# Patient Record
Sex: MALE | Race: OTHER | NOT HISPANIC OR LATINO
[De-identification: names, ages, dates, MRNs, and addresses within clinical notes are randomized per-mention and may not be internally consistent; named-entity substitution may affect disease eponyms.]

---

## 2023-09-22 PROBLEM — Z00.00 ENCOUNTER FOR PREVENTIVE HEALTH EXAMINATION: Status: ACTIVE | Noted: 2023-09-22

## 2023-09-26 ENCOUNTER — APPOINTMENT (OUTPATIENT)
Dept: UROLOGY | Facility: CLINIC | Age: 78
End: 2023-09-26

## 2023-10-04 ENCOUNTER — APPOINTMENT (OUTPATIENT)
Dept: UROLOGY | Facility: CLINIC | Age: 78
End: 2023-10-04

## 2023-11-07 ENCOUNTER — APPOINTMENT (OUTPATIENT)
Dept: UROLOGY | Facility: CLINIC | Age: 78
End: 2023-11-07

## 2023-11-15 ENCOUNTER — APPOINTMENT (OUTPATIENT)
Dept: UROLOGY | Facility: CLINIC | Age: 78
End: 2023-11-15
Payer: MEDICARE

## 2023-11-15 VITALS
DIASTOLIC BLOOD PRESSURE: 80 MMHG | WEIGHT: 152 LBS | HEART RATE: 74 BPM | OXYGEN SATURATION: 95 % | RESPIRATION RATE: 14 BRPM | SYSTOLIC BLOOD PRESSURE: 148 MMHG

## 2023-11-15 PROCEDURE — 99204 OFFICE O/P NEW MOD 45 MIN: CPT

## 2023-11-16 LAB
APPEARANCE: CLEAR
BACTERIA: NEGATIVE /HPF
BILIRUBIN URINE: NEGATIVE
BLOOD URINE: NEGATIVE
CAST: 1 /LPF
COLOR: NORMAL
EPITHELIAL CELLS: 1 /HPF
GLUCOSE QUALITATIVE U: 500 MG/DL
KETONES URINE: NEGATIVE MG/DL
LEUKOCYTE ESTERASE URINE: ABNORMAL
MICROSCOPIC-UA: NORMAL
NITRITE URINE: NEGATIVE
PH URINE: 6.5
PROTEIN URINE: NORMAL MG/DL
RED BLOOD CELLS URINE: 3 /HPF
SPECIFIC GRAVITY URINE: 1.02
UROBILINOGEN URINE: 1 MG/DL
WHITE BLOOD CELLS URINE: 4 /HPF

## 2023-11-17 LAB — BACTERIA UR CULT: NORMAL

## 2023-11-20 LAB — URINE CYTOLOGY: NORMAL

## 2023-12-13 ENCOUNTER — APPOINTMENT (OUTPATIENT)
Dept: UROLOGY | Facility: CLINIC | Age: 78
End: 2023-12-13

## 2024-01-05 ENCOUNTER — NON-APPOINTMENT (OUTPATIENT)
Age: 79
End: 2024-01-05

## 2024-01-10 ENCOUNTER — NON-APPOINTMENT (OUTPATIENT)
Age: 79
End: 2024-01-10

## 2024-01-10 ENCOUNTER — APPOINTMENT (OUTPATIENT)
Dept: UROLOGY | Facility: CLINIC | Age: 79
End: 2024-01-10

## 2024-01-23 ENCOUNTER — APPOINTMENT (OUTPATIENT)
Dept: CT IMAGING | Facility: CLINIC | Age: 79
End: 2024-01-23
Payer: MEDICARE

## 2024-01-23 PROCEDURE — 74178 CT ABD&PLV WO CNTR FLWD CNTR: CPT

## 2024-01-26 ENCOUNTER — APPOINTMENT (OUTPATIENT)
Dept: UROLOGY | Facility: CLINIC | Age: 79
End: 2024-01-26
Payer: MEDICARE

## 2024-01-26 VITALS
HEART RATE: 75 BPM | WEIGHT: 152 LBS | OXYGEN SATURATION: 98 % | SYSTOLIC BLOOD PRESSURE: 121 MMHG | HEIGHT: 64 IN | RESPIRATION RATE: 14 BRPM | DIASTOLIC BLOOD PRESSURE: 66 MMHG | TEMPERATURE: 97.4 F | BODY MASS INDEX: 25.95 KG/M2

## 2024-01-26 DIAGNOSIS — R31.0 GROSS HEMATURIA: ICD-10-CM

## 2024-01-26 DIAGNOSIS — D49.4 NEOPLASM OF UNSPECIFIED BEHAVIOR OF BLADDER: ICD-10-CM

## 2024-01-26 PROCEDURE — 99215 OFFICE O/P EST HI 40 MIN: CPT | Mod: 25

## 2024-01-26 PROCEDURE — 52000 CYSTOURETHROSCOPY: CPT

## 2024-01-26 NOTE — HISTORY OF PRESENT ILLNESS
[FreeTextEntry1] : JOSETTE PHILLIPS Jan 24 1945  Language: Norwegian Date of First visit: 10/04/2023 Accompanied by: self Contact info: Referring Provider/PCP: Dr. Hawk Fax: [phone]  CC/ Problem List: Gross hematuria  =============================================================================== FIRST VISIT / Summary: Very pleasant 78 year old M here for gross hematuria, mostly brown for 2 days then stopped. Former smoker. Drinks about 5 coffees per day and one water.  LUTS, minimal sometimes slow stream, but usually not. No straining, no hesitancy or incontinence or dysuria.  They denied risk factors except as noted above including but not limited to: chemicals including dye, petroleum derivatives, chemotherapy, radiation, foreign objects (stents, catheters, stones, etc), or infections (TB, schistosomiasis, etc).  ------------------------------------------------------------------------------------------- INTERVAL VISITS:  ===============================================================================  PMH: HLD, memory Meds: Statin ?dementia medication All: nkda FHx: brother with stones. No  malignancies SocHx: smoker, about 40 yrs 1 pack / day. no etoh.  PSH: none  ROS: Review of Systems is as per HPI unless otherwise denoted below  =============================================================================== DATA: LABS (SELECTED):---------------------------------------------------------------------------------------------------   RADS:------------------------------------------------------------------------------------------------------------------- 1/23/24: CT urogram: no filling defects in upper tracts, bladder mass at dome.  PATHOLOGY/CYTOLOGY:-------------------------------------------------------------------------------------------   VOIDING STUDIES: ---------------------------------------------------------------------------------------------------- 11/15/2023 PVR: 9  STONE STUDIES: (Analysis/LLSA)----------------------------------------------------------------------------------   PROCEDURES: ----------------------------------------------------------------------------------------------- 01/26/2024 Cysto: Two tumors eac 1-2cm in size posterior left near dome. Additional erythematous mucosa lining one of a number of diverticuli.   =============================================================================== PHYSICAL EXAM:  FOCUSED: ---------------------------------------------------------------------------------------------------------------- Date: 01/26/2024 Chaperone: offered and patient declined   Penis: No lesions, tenderness, curvature, plaques. Circumcised Meatus: normal caliber  ======================================================================================= DISCUSSION: ======================================================================================= ASSESSMENT and PLAN  1.Gross hematuria - needs Urine cx, blood tests and EKG - he will see Dr. Hawk for clearance for surgery 2/8 - states not on any antiplatelents or DM medications that would need to be adjusted.  ======================================================================================= 5+ procedure  Based on the patient's findings as documented above, they decided to proceed with planned surgical management. Risk factors as identified in the history and assessment / plan. The patient's imaging study prior to this visit was personally reviewed and the above is my independent interpretation for the  organ systems.  They are at increased risk of morbidity from additional testing and treatment that may be required based on the results of the above evaluation and tumor extending into diverticuli.  Thank you for allowing me to assist in the care of your patient. Should you have any questions please do not hesitate to reach out to me.   Lobo Lopez MD       [facility] Physician [facility]  [place] Office: [address], [address] [place] [address] T: [phone] F: [phone]  [place] Office: [address], 1st [place] [address] T: [phone] F: [phone]

## 2024-01-28 LAB
ALBUMIN SERPL ELPH-MCNC: 4.3 G/DL
ALP BLD-CCNC: 121 U/L
ALT SERPL-CCNC: 13 U/L
ANION GAP SERPL CALC-SCNC: 9 MMOL/L
AST SERPL-CCNC: 17 U/L
BACTERIA UR CULT: NORMAL
BASOPHILS # BLD AUTO: 0.05 K/UL
BASOPHILS NFR BLD AUTO: 0.8 %
BILIRUB SERPL-MCNC: 0.2 MG/DL
BUN SERPL-MCNC: 14 MG/DL
CALCIUM SERPL-MCNC: 9.2 MG/DL
CHLORIDE SERPL-SCNC: 103 MMOL/L
CO2 SERPL-SCNC: 26 MMOL/L
CREAT SERPL-MCNC: 0.74 MG/DL
EGFR: 92 ML/MIN/1.73M2
EOSINOPHIL # BLD AUTO: 0.08 K/UL
EOSINOPHIL NFR BLD AUTO: 1.3 %
ESTIMATED AVERAGE GLUCOSE: 128 MG/DL
GLUCOSE SERPL-MCNC: 146 MG/DL
HBA1C MFR BLD HPLC: 6.1 %
HCT VFR BLD CALC: 39 %
HGB BLD-MCNC: 12.7 G/DL
IMM GRANULOCYTES NFR BLD AUTO: 0.2 %
INR PPP: 1.04 RATIO
LYMPHOCYTES # BLD AUTO: 2.26 K/UL
LYMPHOCYTES NFR BLD AUTO: 36.7 %
MAN DIFF?: NORMAL
MCHC RBC-ENTMCNC: 31.8 PG
MCHC RBC-ENTMCNC: 32.6 GM/DL
MCV RBC AUTO: 97.7 FL
MONOCYTES # BLD AUTO: 0.55 K/UL
MONOCYTES NFR BLD AUTO: 8.9 %
NEUTROPHILS # BLD AUTO: 3.21 K/UL
NEUTROPHILS NFR BLD AUTO: 52.1 %
PLATELET # BLD AUTO: 276 K/UL
POTASSIUM SERPL-SCNC: 4.4 MMOL/L
PROT SERPL-MCNC: 7.2 G/DL
PT BLD: 11.7 SEC
RBC # BLD: 3.99 M/UL
RBC # FLD: 11.8 %
SODIUM SERPL-SCNC: 139 MMOL/L
WBC # FLD AUTO: 6.16 K/UL

## 2024-01-29 LAB — ABO + RH PNL BLD: NORMAL

## 2024-02-07 RX ORDER — APREPITANT 80 MG/1
40 CAPSULE ORAL ONCE
Refills: 0 | Status: COMPLETED | OUTPATIENT
Start: 2024-02-15 | End: 2024-02-15

## 2024-02-07 RX ORDER — ACETAMINOPHEN 500 MG
1000 TABLET ORAL ONCE
Refills: 0 | Status: COMPLETED | OUTPATIENT
Start: 2024-02-15 | End: 2024-02-15

## 2024-02-08 ENCOUNTER — APPOINTMENT (OUTPATIENT)
Dept: UROLOGY | Facility: AMBULATORY SURGERY CENTER | Age: 79
End: 2024-02-08

## 2024-02-08 ENCOUNTER — NON-APPOINTMENT (OUTPATIENT)
Age: 79
End: 2024-02-08

## 2024-02-14 ENCOUNTER — TRANSCRIPTION ENCOUNTER (OUTPATIENT)
Age: 79
End: 2024-02-14

## 2024-02-14 NOTE — ASU PATIENT PROFILE, ADULT - NS PREOP UNDERSTANDS INFO
No solid food/dairy/candy/gum after 08:00am tomorrow; water allowed before 14:30pm tomorrow; patient reminded to come with photo ID/insurance/credit card; dress in comfortable clothes; no jewelries/contact lens/valuable; no smoking/alcohol drinking/recreational drug use today; escort to have photo ID; address and callback number was given;/yes

## 2024-02-14 NOTE — ASU PATIENT PROFILE, ADULT - NSICDXPASTMEDICALHX_GEN_ALL_CORE_FT
PAST MEDICAL HISTORY:  Hematuria     Mild hyperlipidemia     Stroke      PAST MEDICAL HISTORY:  Dementia     Hematuria     Mild hyperlipidemia     Osteoarthritis

## 2024-02-14 NOTE — ASU PATIENT PROFILE, ADULT - MEDICATION HERBAL REMEDIES, PROFILE
Physical Therapy Daily Progress Note  Visit: 5    Gilmar Masterson reports: I am feeling ok today. Did not have any trouble after new exercises    Subjective     Objective   See Exercise, Manual, and Modality Logs for complete treatment.       Assessment & Plan     Assessment  Assessment details: Pt tolerated treatment well. Discussed doing self MET correction at home until I see him next to help with right low back pain    Plan  Plan details: Begin resisted walking as able        Manual Therapy:    4     mins  26228;  Therapeutic Exercise:    39     mins  44514;     Neuromuscular Ziyad:    9    mins  57742;    Therapeutic Activity:     -     mins  64418;     Gait Training:      -     mins  07168;     Ultrasound:     -     mins  67248;    Electrical Stimulation:    -     mins  32754 ( );  Dry Needling     -     mins self-pay    Timed Treatment:   52   mins   Total Treatment:     62   mins    Sheila Grubbs PT  [place] License #: [id number]    Physical Therapist      
no

## 2024-02-15 ENCOUNTER — RESULT REVIEW (OUTPATIENT)
Age: 79
End: 2024-02-15

## 2024-02-15 ENCOUNTER — OUTPATIENT (OUTPATIENT)
Dept: OUTPATIENT SERVICES | Facility: HOSPITAL | Age: 79
LOS: 1 days | Discharge: HOME | End: 2024-02-15
Payer: MEDICARE

## 2024-02-15 ENCOUNTER — APPOINTMENT (OUTPATIENT)
Dept: UROLOGY | Facility: AMBULATORY SURGERY CENTER | Age: 79
End: 2024-02-15

## 2024-02-15 ENCOUNTER — TRANSCRIPTION ENCOUNTER (OUTPATIENT)
Age: 79
End: 2024-02-15

## 2024-02-15 VITALS
DIASTOLIC BLOOD PRESSURE: 75 MMHG | OXYGEN SATURATION: 99 % | RESPIRATION RATE: 16 BRPM | SYSTOLIC BLOOD PRESSURE: 133 MMHG | WEIGHT: 147.93 LBS | HEIGHT: 63 IN | TEMPERATURE: 97 F | HEART RATE: 68 BPM

## 2024-02-15 VITALS
HEART RATE: 59 BPM | SYSTOLIC BLOOD PRESSURE: 135 MMHG | DIASTOLIC BLOOD PRESSURE: 64 MMHG | OXYGEN SATURATION: 95 % | RESPIRATION RATE: 15 BRPM

## 2024-02-15 DIAGNOSIS — Z98.890 OTHER SPECIFIED POSTPROCEDURAL STATES: Chronic | ICD-10-CM

## 2024-02-15 PROCEDURE — 52240 CYSTOSCOPY AND TREATMENT: CPT

## 2024-02-15 PROCEDURE — 88305 TISSUE EXAM BY PATHOLOGIST: CPT | Mod: 26

## 2024-02-15 RX ORDER — SODIUM CHLORIDE 9 MG/ML
1000 INJECTION, SOLUTION INTRAVENOUS
Refills: 0 | Status: DISCONTINUED | OUTPATIENT
Start: 2024-02-15 | End: 2024-02-15

## 2024-02-15 RX ORDER — ACETAMINOPHEN 500 MG
650 TABLET ORAL EVERY 4 HOURS
Refills: 0 | Status: DISCONTINUED | OUTPATIENT
Start: 2024-02-15 | End: 2024-02-15

## 2024-02-15 RX ORDER — FENTANYL CITRATE 50 UG/ML
25 INJECTION INTRAVENOUS
Refills: 0 | Status: DISCONTINUED | OUTPATIENT
Start: 2024-02-15 | End: 2024-02-15

## 2024-02-15 RX ORDER — ALFUZOSIN HYDROCHLORIDE 10 MG/1
1 TABLET, EXTENDED RELEASE ORAL
Refills: 0 | DISCHARGE

## 2024-02-15 RX ORDER — ONDANSETRON 8 MG/1
4 TABLET, FILM COATED ORAL ONCE
Refills: 0 | Status: DISCONTINUED | OUTPATIENT
Start: 2024-02-15 | End: 2024-02-15

## 2024-02-15 RX ORDER — ROSUVASTATIN CALCIUM 5 MG/1
1 TABLET ORAL
Refills: 0 | DISCHARGE

## 2024-02-15 RX ORDER — MECLIZINE HCL 12.5 MG
1 TABLET ORAL
Refills: 0 | DISCHARGE

## 2024-02-15 RX ORDER — DONEPEZIL HYDROCHLORIDE 10 MG/1
1 TABLET, FILM COATED ORAL
Refills: 0 | DISCHARGE

## 2024-02-15 RX ORDER — ASPIRIN/CALCIUM CARB/MAGNESIUM 324 MG
0 TABLET ORAL
Refills: 0 | DISCHARGE

## 2024-02-15 RX ORDER — PHENAZOPYRIDINE HCL 100 MG
100 TABLET ORAL ONCE
Refills: 0 | Status: COMPLETED | OUTPATIENT
Start: 2024-02-15 | End: 2025-01-13

## 2024-02-15 RX ORDER — PHENAZOPYRIDINE HCL 100 MG
100 TABLET ORAL ONCE
Refills: 0 | Status: COMPLETED | OUTPATIENT
Start: 2024-02-15 | End: 2024-02-15

## 2024-02-15 RX ADMIN — APREPITANT 40 MILLIGRAM(S): 80 CAPSULE ORAL at 15:21

## 2024-02-15 RX ADMIN — Medication 100 MILLIGRAM(S): at 22:26

## 2024-02-15 RX ADMIN — Medication 1000 MILLIGRAM(S): at 15:21

## 2024-02-15 RX ADMIN — FENTANYL CITRATE 25 MICROGRAM(S): 50 INJECTION INTRAVENOUS at 22:21

## 2024-02-15 RX ADMIN — FENTANYL CITRATE 25 MICROGRAM(S): 50 INJECTION INTRAVENOUS at 21:59

## 2024-02-15 RX ADMIN — SODIUM CHLORIDE 100 MILLILITER(S): 9 INJECTION, SOLUTION INTRAVENOUS at 21:41

## 2024-02-15 NOTE — BRIEF OPERATIVE NOTE - NSICDXBRIEFPROCEDURE_GEN_ALL_CORE_FT
PROCEDURES:  Transurethral resection of bladder tumor (TURBT) with biopsy 15-Feb-2024 21:41:45  Edilberto Michelle

## 2024-02-15 NOTE — BRIEF OPERATIVE NOTE - SPECIMENS
1. bladder tumor 1 midline dome, 2. bladder tumor 2 dome, 3. bladder tumor 1 base, 4. posterior wall biopsy, 5. anterior wall biopsy, 6. left lateral wall biopsy, 7. right lateral wall biopsy

## 2024-02-15 NOTE — ASU DISCHARGE PLAN (ADULT/PEDIATRIC) - NS MD DC FALL RISK RISK
For information on Fall & Injury Prevention, visit: [email or web address] OR  [email or web address] OR  https://www.cdc.gov/steadi/patient.html

## 2024-02-15 NOTE — ASU DISCHARGE PLAN (ADULT/PEDIATRIC) - CARE PROVIDER_API CALL
Lobo Lopez  Urology  [address], [address]  [place] [address]  Phone: [phone]  Fax: [phone]  Scheduled Appointment: 02/20/2024

## 2024-02-15 NOTE — ASU DISCHARGE PLAN (ADULT/PEDIATRIC) - ASU DC SPECIAL INSTRUCTIONSFT
TRANSURETHRAL RESECTION OF BLADDER TUMOR    GENERAL: It is common to have blood in your urine after your procedure.  It may be pink or even red; and it is important to increase fluid intake to 2-3L of water per day to keep the urine as clear as possible. Please inform your doctor if you have a significant amount of clot in the urine or if you are unable to void at all.  The urine may clear and then become bloody again especially as you are more physically active. It is not uncommon to have some burning when you urinate, this will gradually improve. If a catheter is in place, it is not uncommon to have occasional leakage or urine or blood around the catheter. Please call your urologist if this is excessive and/or the urine is not draining through the catheter into the bag.    CATHETER: Some patients are sent home with a Ruano catheter, while others go home urinating on their own. A Ruano catheter continuously drains the urine from the bladder. If you still have a catheter, the nurses will review instructions and care before you go home.     PAIN: You may take Tylenol (acetaminophen) 650-975mg and/or Motrin (ibuprofen) 400-600mg, both available over the counter, for pain every 6 hours as needed. Do not exceed 4000mg of Tylenol (acetaminophen) daily. You may alternate these medications such that you take one or the other every 3 hours for around the clock pain coverage.    ANTIBIOTICS: You are given a prescription for an antibiotic, please take this medication as instructed and be sure to complete the entire course.     STOOL SOFTENERS: Do not allow yourself to become constipated as straining may cause bleeding. Take stool softeners or a laxative (ex. Miralax, Colace, Senokot, ExLax, etc), available over the counter, if needed.    ANTICOAGULATION: If you are taking any blood thinning medications, please discuss with your urologist prior to restarting these medications unless otherwise specified.    BATHING: You may shower or bathe. If going home with ruano, shower only until catheter is removed.    DIET: You may resume your regular diet and regular medication regimen.    ACTIVITY: No heavy lifting or strenuous exercise until you are evaluated at your post-operative appointment. Otherwise, you may return to your usual level of physical activity.    FOLLOW-UP: If you did not already schedule your post-operative appointment, please call your urologist to schedule and follow-up appointment.    CALL YOUR UROLOGIST IF: You have any bleeding that does not stop, inability to void >8 hours, fever over 100.4 F, chills, persistent nausea/vomiting, changes in your incision concerning for infection, or if your pain is not controlled on your discharge pain medications.

## 2024-02-15 NOTE — BRIEF OPERATIVE NOTE - OPERATION/FINDINGS
1 large 5 cm midline dome tumor resected, smaller 1 cm superior to first bladder tumor resected. Random bladder biopsies also taken. ruano placed at end of case

## 2024-02-16 ENCOUNTER — NON-APPOINTMENT (OUTPATIENT)
Age: 79
End: 2024-02-16

## 2024-02-19 LAB — SURGICAL PATHOLOGY STUDY: SIGNIFICANT CHANGE UP

## 2024-02-20 ENCOUNTER — APPOINTMENT (OUTPATIENT)
Age: 79
End: 2024-02-20
Payer: MEDICARE

## 2024-02-20 VITALS
WEIGHT: 152 LBS | HEART RATE: 73 BPM | RESPIRATION RATE: 14 BRPM | OXYGEN SATURATION: 98 % | BODY MASS INDEX: 25.95 KG/M2 | HEIGHT: 64 IN | SYSTOLIC BLOOD PRESSURE: 133 MMHG | TEMPERATURE: 96 F | DIASTOLIC BLOOD PRESSURE: 79 MMHG

## 2024-02-20 PROBLEM — E78.5 HYPERLIPIDEMIA, UNSPECIFIED: Chronic | Status: ACTIVE | Noted: 2024-02-14

## 2024-02-20 PROBLEM — F03.90 UNSPECIFIED DEMENTIA WITHOUT BEHAVIORAL DISTURBANCE: Chronic | Status: ACTIVE | Noted: 2024-02-15

## 2024-02-20 PROBLEM — R31.9 HEMATURIA, UNSPECIFIED: Chronic | Status: ACTIVE | Noted: 2024-02-14

## 2024-02-20 PROBLEM — F03.90 UNSPECIFIED DEMENTIA, UNSPECIFIED SEVERITY, WITHOUT BEHAVIORAL DISTURBANCE, PSYCHOTIC DISTURBANCE, MOOD DISTURBANCE, AND ANXIETY: Chronic | Status: ACTIVE | Noted: 2024-02-15

## 2024-02-20 PROBLEM — M19.90 UNSPECIFIED OSTEOARTHRITIS, UNSPECIFIED SITE: Chronic | Status: ACTIVE | Noted: 2024-02-15

## 2024-02-20 PROCEDURE — 51700 IRRIGATION OF BLADDER: CPT

## 2024-02-20 PROCEDURE — A4216: CPT | Mod: NC

## 2024-02-20 PROCEDURE — 51798 US URINE CAPACITY MEASURE: CPT

## 2024-02-20 NOTE — ASSESSMENT
[FreeTextEntry1] : Impression/Plan: 79 year old male s/p TURBT 2/15/2024 passed TOV -Denies pain at tip of penis since removal of catheter -Advised on s/s of urinary retention -Advised to increase water intake to ~2L daily -Provided with office number to call with any questions/concerns -RTC if unable to void or with urinary symptoms

## 2024-02-20 NOTE — HISTORY OF PRESENT ILLNESS
[FreeTextEntry1] : 79 year old male accompanied by his wife here for TOV s/p TURBT on 2/15/2024 Verbalizes will complete course of antibiotic morning of 2/21/2024 Reports pain at tip of penis otherwise he reports feeling well today. Denies dizziness, fever, chill, nausea, or vomiting.   Ricci bag draining tea colored urine  Instilled 240cc sterile water Catheter removed in its entirety Voided 150cc dark red urine with some clots noted  drank 2 cups of water in office, went for a walk and returned ~30 minutes later and voided 100cc dark red urine output no clots noted PVR 85 with good flow

## 2024-02-20 NOTE — PHYSICAL EXAM
[Normal Appearance] : normal appearance [Well Groomed] : well groomed [General Appearance - In No Acute Distress] : no acute distress [Respiration, Rhythm And Depth] : normal respiratory rhythm and effort [Exaggerated Use Of Accessory Muscles For Inspiration] : no accessory muscle use [Abdomen Soft] : soft [Abdomen Tenderness] : non-tender [Costovertebral Angle Tenderness] : no ~M costovertebral angle tenderness [Urethral Meatus] : meatus normal [Normal Station and Gait] : the gait and station were normal for the patient's age [] : no rash [Oriented To Time, Place, And Person] : oriented to person, place, and time [Affect] : the affect was normal [Mood] : the mood was normal

## 2024-02-23 ENCOUNTER — NON-APPOINTMENT (OUTPATIENT)
Age: 79
End: 2024-02-23

## 2024-02-27 ENCOUNTER — APPOINTMENT (OUTPATIENT)
Dept: UROLOGY | Facility: CLINIC | Age: 79
End: 2024-02-27
Payer: MEDICARE

## 2024-02-27 VITALS
TEMPERATURE: 98.2 F | WEIGHT: 152 LBS | OXYGEN SATURATION: 100 % | BODY MASS INDEX: 25.95 KG/M2 | SYSTOLIC BLOOD PRESSURE: 128 MMHG | HEART RATE: 165 BPM | HEIGHT: 64 IN | DIASTOLIC BLOOD PRESSURE: 75 MMHG | RESPIRATION RATE: 14 BRPM

## 2024-02-27 PROCEDURE — 99214 OFFICE O/P EST MOD 30 MIN: CPT

## 2024-02-27 PROCEDURE — 81003 URINALYSIS AUTO W/O SCOPE: CPT | Mod: QW

## 2024-02-27 PROCEDURE — G2211 COMPLEX E/M VISIT ADD ON: CPT

## 2024-02-27 NOTE — HISTORY OF PRESENT ILLNESS
[FreeTextEntry1] : JOSETTE PHILLIPS Jan 24 1945  Language: Malaysian Date of First visit: 10/04/2023 Accompanied by: self Contact info:  Referring Provider/PCP: Dr. Hawk Fax: [phone]  CC/ Problem List: Bladder cancer  =============================================================================== FIRST VISIT / Summary: Very pleasant 78 year old M here for gross hematuria, mostly brown for 2 days then stopped. Former smoker. Drinks about 5 coffees per day and one water.  LUTS, minimal sometimes slow stream, but usually not. No straining, no hesitancy or incontinence or dysuria.  They denied risk factors except as noted above including but not limited to: chemicals including dye, petroleum derivatives, chemotherapy, radiation, foreign objects (stents, catheters, stones, etc), or infections (TB, schistosomiasis, etc).  ------------------------------------------------------------------------------------------- INTERVAL VISITS: The patient's medications and allergies were reviewed and edited below. Dated 02/27/2024   s/p TURBT to discuss results. ===============================================================================  PMH: HLD, memory Meds: Statin ?dementia medication All: nkda FHx: brother with stones. No  malignancies SocHx: smoker, about 40 yrs 1 pack / day. no etoh.  PSH: none  ROS: Review of Systems is as per HPI unless otherwise denoted below  =============================================================================== DATA: LABS (SELECTED):---------------------------------------------------------------------------------------------------   RADS:------------------------------------------------------------------------------------------------------------------- 1/23/24: CT urogram: no filling defects in upper tracts, bladder mass at dome.  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 Additional erythematous mucosa lining one of a number of diverticuli. 2/15/24: TURBT result high grade multifocal with CIS   =============================================================================== PHYSICAL EXAM:  FOCUSED: ---------------------------------------------------------------------------------------------------------------- Date: 01/26/2024 Chaperone: offered and patient declined  Penis: No lesions, tenderness, curvature, plaques. Circumcised Meatus: normal caliber  ======================================================================================= DISCUSSION: ======================================================================================= ASSESSMENT and PLAN  1.bladder cancer - 2/15/24: TURBT result high grade multifocal with CIS. Discussed results - Horacio Womack [phone] - Recommend start BCG in 2 weeks  2. Dysuria - check UA - trace LE, negative nitrites - cx and abx if positive   ======================================================================================= 4g The total time personally spent preparing for this visit (reviewing test results, obtaining external history) and during the visit (ordering tests/medications, spent face to face with the patient / family and counseling them on the above), as well as after the visit (on clinical documentation and coordination with other care providers) was approximately 35 minutes.   Thank you for allowing me to assist in the care of your patient. Should you have any questions please do not hesitate to reach out to me.   Lobo Lopez MD [facility] Physician [facility]  [place] Office: [address], [address] [place] [address] T: [phone] F: [phone]  [place] Office: [address] [place] [address], 1st [place] [address] T: [phone] F: [phone]

## 2024-02-29 LAB
BACTERIA UR CULT: NORMAL
BILIRUB UR QL STRIP: NEGATIVE
GLUCOSE UR-MCNC: 250
HCG UR QL: 1 EU/DL
HGB UR QL STRIP.AUTO: NORMAL
KETONES UR-MCNC: NEGATIVE
LEUKOCYTE ESTERASE UR QL STRIP: NORMAL
NITRITE UR QL STRIP: NEGATIVE
PH UR STRIP: 7.5
PROT UR STRIP-MCNC: 30
SP GR UR STRIP: 1.02

## 2024-03-12 ENCOUNTER — APPOINTMENT (OUTPATIENT)
Dept: UROLOGY | Facility: CLINIC | Age: 79
End: 2024-03-12
Payer: MEDICARE

## 2024-03-12 VITALS
HEIGHT: 64 IN | HEART RATE: 61 BPM | TEMPERATURE: 98.3 F | RESPIRATION RATE: 14 BRPM | OXYGEN SATURATION: 98 % | SYSTOLIC BLOOD PRESSURE: 145 MMHG | DIASTOLIC BLOOD PRESSURE: 67 MMHG | WEIGHT: 152 LBS | BODY MASS INDEX: 25.95 KG/M2

## 2024-03-12 PROCEDURE — 51720 TREATMENT OF BLADDER LESION: CPT

## 2024-03-12 PROCEDURE — 99213 OFFICE O/P EST LOW 20 MIN: CPT | Mod: 25

## 2024-03-12 RX ORDER — BACILLUS CALMETTE-GUERIN 50 MG/50ML
50 POWDER, FOR SUSPENSION INTRAVESICAL
Refills: 0 | Status: COMPLETED | OUTPATIENT
Start: 2024-03-12

## 2024-03-12 RX ADMIN — BACILLUS CALMETTE-GUERIN 0 MG: 50 POWDER, FOR SUSPENSION INTRAVESICAL at 00:00

## 2024-03-12 NOTE — HISTORY OF PRESENT ILLNESS
[FreeTextEntry1] : JOSETTE PHILLIPS Jan 24 1945  Language: Surinamese Date of First visit: 10/04/2023 Accompanied by: self Contact info: Referring Provider/PCP: Dr. Hawk Fax: [phone]  CC/ Problem List: Bladder cancer  =============================================================================== FIRST VISIT / Summary: Very pleasant 78 year old M here for gross hematuria, mostly brown for 2 days then stopped. Former smoker. Drinks about 5 coffees per day and one water.  LUTS, minimal sometimes slow stream, but usually not. No straining, no hesitancy or incontinence or dysuria.  They denied risk factors except as noted above including but not limited to: chemicals including dye, petroleum derivatives, chemotherapy, radiation, foreign objects (stents, catheters, stones, etc), or infections (TB, schistosomiasis, etc).  ------------------------------------------------------------------------------------------- INTERVAL VISITS: The patient's medications and allergies were reviewed and edited below. Dated The patient's medications and allergies were reviewed and edited below. Dated 03/12/2024   Here for BCG. Given shortage, only 1/2 dose available. Signed consent.  ===============================================================================  PMH: HLD, memory Meds: Statin ?dementia medication All: nkda FHx: brother with stones. No  malignancies SocHx: smoker, about 40 yrs 1 pack / day. no etoh.  PSH: none  ROS: Review of Systems is as per HPI unless otherwise denoted below  =============================================================================== DATA: LABS (SELECTED):---------------------------------------------------------------------------------------------------   RADS:------------------------------------------------------------------------------------------------------------------- 1/23/24: CT urogram: no filling defects in upper tracts, bladder mass at dome.  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 Additional erythematous mucosa lining one of a number of diverticuli. 2/15/24: TURBT result high grade multifocal with CIS 03/12/2024 BCG induction 1/2 strength dose 1/6   =============================================================================== PHYSICAL EXAM:  FOCUSED: ---------------------------------------------------------------------------------------------------------------- Date: 01/26/2024 Chaperone: offered and patient declined  Penis: No lesions, tenderness, curvature, plaques. Circumcised Meatus: normal caliber  ======================================================================================= DISCUSSION: ======================================================================================= ASSESSMENT and PLAN  1.bladder cancer - 2/15/24: TURBT result high grade multifocal with CIS. Discussed results - Horacio Womack [phone] - Recommend start BCG in 2 weeks  2. LUTS - minimal after surgery  ======================================================================================= 3+ procedure Thank you for allowing me to assist in the care of your patient. Should you have any questions please do not hesitate to reach out to me.   Lobo Lopez MD [facility] Physician [facility]  [place] Office: [address], [address] [place] [address] T: [phone] F: [phone]  [place] Office: [address] [place] [address], 1st [place] [address] T: [phone] F: [phone]

## 2024-03-26 ENCOUNTER — APPOINTMENT (OUTPATIENT)
Dept: UROLOGY | Facility: CLINIC | Age: 79
End: 2024-03-26

## 2024-03-26 ENCOUNTER — APPOINTMENT (OUTPATIENT)
Dept: UROLOGY | Facility: CLINIC | Age: 79
End: 2024-03-26
Payer: MEDICARE

## 2024-03-26 VITALS
BODY MASS INDEX: 25.95 KG/M2 | TEMPERATURE: 97.5 F | WEIGHT: 152 LBS | RESPIRATION RATE: 14 BRPM | DIASTOLIC BLOOD PRESSURE: 72 MMHG | HEART RATE: 63 BPM | HEIGHT: 64 IN | OXYGEN SATURATION: 100 % | SYSTOLIC BLOOD PRESSURE: 161 MMHG

## 2024-03-26 PROCEDURE — 51720 TREATMENT OF BLADDER LESION: CPT

## 2024-03-26 PROCEDURE — 99213 OFFICE O/P EST LOW 20 MIN: CPT | Mod: 25

## 2024-03-26 RX ORDER — BACILLUS CALMETTE-GUERIN 50 MG/50ML
50 POWDER, FOR SUSPENSION INTRAVESICAL
Qty: 0 | Refills: 0 | Status: COMPLETED | OUTPATIENT
Start: 2024-03-26

## 2024-03-26 RX ORDER — BACILLUS CALMETTE-GUERIN 50 MG/50ML
50 POWDER, FOR SUSPENSION INTRAVESICAL
Refills: 0 | Status: COMPLETED | OUTPATIENT
Start: 2024-03-26

## 2024-03-26 RX ADMIN — BACILLUS CALMETTE-GUERIN 0 MG: 50 POWDER, FOR SUSPENSION INTRAVESICAL at 00:00

## 2024-03-26 NOTE — HISTORY OF PRESENT ILLNESS
[FreeTextEntry1] : JOSETTE PHILLIPS Jan 24 1945  Language: Kyrgyz Date of First visit: 10/04/2023 Accompanied by: self Contact info: Referring Provider/PCP: Dr. Hawk Fax: [phone]  CC/ Problem List: Bladder cancer  =============================================================================== FIRST VISIT / Summary: Very pleasant 78 year old M here for gross hematuria, mostly brown for 2 days then stopped. Former smoker. Drinks about 5 coffees per day and one water.  LUTS, minimal sometimes slow stream, but usually not. No straining, no hesitancy or incontinence or dysuria.  They denied risk factors except as noted above including but not limited to: chemicals including dye, petroleum derivatives, chemotherapy, radiation, foreign objects (stents, catheters, stones, etc), or infections (TB, schistosomiasis, etc).  ------------------------------------------------------------------------------------------- INTERVAL VISITS: The patient's medications and allergies were reviewed and edited below. Dated The patient's medications and allergies were reviewed and edited below. Dated 03/26/2024  Here for BCG. Given shortage, only 1/2 dose available. After last dose had temp about 99.9 degrees. Discussed this is common after BCG. Reports feeling well today. No fever or chills but had some sweat on his forehead while in the car. Denies dysuria or hematuria    ===============================================================================  PMH: HLD, memory Meds: Statin ?dementia medication All: nkda FHx: brother with stones. No  malignancies SocHx: smoker, about 40 yrs 1 pack / day. no etoh.  PSH: none  ROS: Review of Systems is as per HPI unless otherwise denoted below  =============================================================================== DATA: LABS (SELECTED):---------------------------------------------------------------------------------------------------   RADS:------------------------------------------------------------------------------------------------------------------- 1/23/24: CT urogram: no filling defects in upper tracts, bladder mass at dome.  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 Additional erythematous mucosa lining one of a number of diverticuli. 2/15/24: TURBT result high grade multifocal with CIS 03/12/2024 BCG induction 1/2 strength dose 1/6 03/26/2024 BCG induction 1/2 strength dose 2/6  =============================================================================== PHYSICAL EXAM:  FOCUSED: ---------------------------------------------------------------------------------------------------------------- Date: 01/26/2024 Chaperone: offered and patient declined  Penis: No lesions, tenderness, curvature, plaques. Circumcised Meatus: normal caliber  ======================================================================================= DISCUSSION: ======================================================================================= ASSESSMENT and PLAN  1.bladder cancer - 2/15/24: TURBT result high grade multifocal with CIS. Discussed results - Horacio Womack [phone] - Recommend start BCG in 2 weeks  2. LUTS - minimal after surgery  ======================================================================================= 3+ procedure Thank you for allowing me to assist in the care of your patient. Should you have any questions please do not hesitate to reach out to me.   Lobo Lopez MD [facility] Physician [facility]  [place] Office: [address] [facility], [address] [place] [address] T: [phone] F: [phone]  [place] Office: [address] [place] [address], 1st [place] [address] T: [phone] F: [phone]

## 2024-03-27 ENCOUNTER — APPOINTMENT (OUTPATIENT)
Dept: UROLOGY | Facility: CLINIC | Age: 79
End: 2024-03-27

## 2024-03-28 LAB — BACTERIA UR CULT: NORMAL

## 2024-04-03 ENCOUNTER — APPOINTMENT (OUTPATIENT)
Dept: UROLOGY | Facility: CLINIC | Age: 79
End: 2024-04-03
Payer: MEDICARE

## 2024-04-03 VITALS
RESPIRATION RATE: 14 BRPM | DIASTOLIC BLOOD PRESSURE: 74 MMHG | BODY MASS INDEX: 26.12 KG/M2 | SYSTOLIC BLOOD PRESSURE: 162 MMHG | WEIGHT: 153 LBS | OXYGEN SATURATION: 98 % | HEART RATE: 68 BPM | TEMPERATURE: 98 F | HEIGHT: 64 IN

## 2024-04-03 PROCEDURE — 99212 OFFICE O/P EST SF 10 MIN: CPT | Mod: 25

## 2024-04-03 PROCEDURE — 51720 TREATMENT OF BLADDER LESION: CPT

## 2024-04-03 RX ORDER — BACILLUS CALMETTE-GUERIN 50 MG/50ML
50 POWDER, FOR SUSPENSION INTRAVESICAL
Refills: 0 | Status: COMPLETED | OUTPATIENT
Start: 2024-04-03

## 2024-04-03 RX ADMIN — BACILLUS CALMETTE-GUERIN 0 MG: 50 POWDER, FOR SUSPENSION INTRAVESICAL at 00:00

## 2024-04-04 NOTE — HISTORY OF PRESENT ILLNESS
[FreeTextEntry1] : JOSETTE PHILLIPS Jan 24 1945  Language: Czech Date of First visit: 10/04/2023 Accompanied by: self Contact info: Referring Provider/PCP: Dr. Hawk Fax: [phone]  CC/ Problem List: Bladder cancer =============================================================================== FIRST VISIT / Summary: Very pleasant 78 year old M here for gross hematuria, mostly brown for 2 days then stopped. Former smoker. Drinks about 5 coffees per day and one water.  LUTS, minimal sometimes slow stream, but usually not. No straining, no hesitancy or incontinence or dysuria.  They denied risk factors except as noted above including but not limited to: chemicals including dye, petroleum derivatives, chemotherapy, radiation, foreign objects (stents, catheters, stones, etc), or infections (TB, schistosomiasis, etc).  ------------------------------------------------------------------------------------------- INTERVAL VISITS: The patient's medications and allergies were reviewed and edited below. Dated The patient's medications and allergies were reviewed and edited below. Dated 04/03/2024   Here for BCG. Given shortage, only 1/2 dose available. Reports feeling well today. No fever or chills but had some sweat on his forehead while in the car. Denies dysuria or hematuria  ===============================================================================  PMH: HLD, memory Meds: Statin ?dementia medication All: nkda FHx: brother with stones. No  malignancies SocHx: smoker, about 40 yrs 1 pack / day. no etoh.  PSH: none  ROS: Review of Systems is as per HPI unless otherwise denoted below  =============================================================================== DATA: LABS (SELECTED):---------------------------------------------------------------------------------------------------   RADS:------------------------------------------------------------------------------------------------------------------- 1/23/24: CT urogram: no filling defects in upper tracts, bladder mass at dome.  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 Additional erythematous mucosa lining one of a number of diverticuli. 2/15/24: TURBT result high grade multifocal with CIS 03/12/2024 BCG induction 1/2 strength dose 1/6 03/26/2024 BCG induction 1/2 strength dose 2/6 04/03/2024 BCG induction 1/2 strength dose 3/6  =============================================================================== PHYSICAL EXAM:  FOCUSED: ---------------------------------------------------------------------------------------------------------------- Date: 01/26/2024 Chaperone: offered and patient declined  Penis: No lesions, tenderness, curvature, plaques. Circumcised Meatus: normal caliber  ======================================================================================= DISCUSSION: ======================================================================================= ASSESSMENT and PLAN  1.bladder cancer - 2/15/24: TURBT result high grade multifocal with CIS. Discussed results - Horacio Womack [phone] - Continue BCG  2. LUTS - minimal after surgery  ======================================================================================= 3+ procedure Thank you for allowing me to assist in the care of your patient. Should you have any questions please do not hesitate to reach out to me.   Lobo Lopez MD [facility] Physician [facility] for [facility]  [place] Office: [address] [facility], [address] [place] [address] T: [phone] F: [phone]  [place] Office: [address] [place] [address], 1st [place] [address] T: [phone] F: [phone]

## 2024-04-10 ENCOUNTER — APPOINTMENT (OUTPATIENT)
Dept: UROLOGY | Facility: CLINIC | Age: 79
End: 2024-04-10
Payer: MEDICARE

## 2024-04-10 VITALS
DIASTOLIC BLOOD PRESSURE: 70 MMHG | HEIGHT: 64 IN | BODY MASS INDEX: 25.95 KG/M2 | OXYGEN SATURATION: 98 % | HEART RATE: 70 BPM | WEIGHT: 152 LBS | RESPIRATION RATE: 14 BRPM | TEMPERATURE: 97.4 F | SYSTOLIC BLOOD PRESSURE: 154 MMHG

## 2024-04-10 PROCEDURE — 51720 TREATMENT OF BLADDER LESION: CPT

## 2024-04-10 RX ORDER — BACILLUS CALMETTE-GUERIN 50 MG/50ML
50 POWDER, FOR SUSPENSION INTRAVESICAL
Qty: 0 | Refills: 0 | Status: COMPLETED | OUTPATIENT
Start: 2024-04-10

## 2024-04-10 RX ADMIN — BACILLUS CALMETTE-GUERIN 0 MG: 50 POWDER, FOR SUSPENSION INTRAVESICAL at 00:00

## 2024-04-12 LAB — BACTERIA UR CULT: NORMAL

## 2024-04-12 NOTE — HISTORY OF PRESENT ILLNESS
[FreeTextEntry1] : JOSETTE PHILLIPS Jan 24 1945  Language: Rwandan Date of First visit: 10/04/2023 Accompanied by: self Contact info: Referring Provider/PCP: Dr. Hawk Fax: [phone]  CC/ Problem List: Bladder cancer =============================================================================== FIRST VISIT / Summary: Very pleasant 78 year old M here for gross hematuria, mostly brown for 2 days then stopped. Former smoker. Drinks about 5 coffees per day and one water.  LUTS, minimal sometimes slow stream, but usually not. No straining, no hesitancy or incontinence or dysuria.  They denied risk factors except as noted above including but not limited to: chemicals including dye, petroleum derivatives, chemotherapy, radiation, foreign objects (stents, catheters, stones, etc), or infections (TB, schistosomiasis, etc).  ------------------------------------------------------------------------------------------- INTERVAL VISITS: The patient's medications and allergies were reviewed and edited below. Dated The patient's medications and allergies were reviewed and edited below. Dated 04/10/2024  Here for BCG. Given shortage, only 1/2 dose available. Reports feeling well today. Denies fever, chills, dysuria or hematuria  ===============================================================================  PMH: HLD, memory Meds: Statin ?dementia medication All: nkda FHx: brother with stones. No  malignancies SocHx: smoker, about 40 yrs 1 pack / day. no etoh.  PSH: none  ROS: Review of Systems is as per HPI unless otherwise denoted below  =============================================================================== DATA: LABS (SELECTED):---------------------------------------------------------------------------------------------------   RADS:------------------------------------------------------------------------------------------------------------------- 1/23/24: CT urogram: no filling defects in upper tracts, bladder mass at dome.  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 Additional erythematous mucosa lining one of a number of diverticuli. 2/15/24: TURBT result high grade multifocal with CIS 03/12/2024 BCG induction 1/2 strength dose 1/6 03/26/2024 BCG induction 1/2 strength dose 2/6 04/03/2024 BCG induction 1/2 strength dose 3/6 04/10/2024 BCG induction 1/2 strength dose 4/6  =============================================================================== PHYSICAL EXAM:  FOCUSED: ---------------------------------------------------------------------------------------------------------------- Date: 01/26/2024 Chaperone: offered and patient declined  Penis: No lesions, tenderness, curvature, plaques. Circumcised Meatus: normal caliber  ======================================================================================= DISCUSSION: ======================================================================================= ASSESSMENT and PLAN  1.bladder cancer - 2/15/24: TURBT result high grade multifocal with CIS. Discussed results - Horacio Womack [phone] - Continue BCG  2. LUTS - minimal after surgery  ======================================================================================= procedure Thank you for allowing me to assist in the care of your patient. Should you have any questions please do not hesitate to reach out to me.   Lobo Lopez MD [facility] Physician [facility] for Urology  [place] Office: [address] [facility], [address] [place] [address] T: [phone] F: [phone]  [place] Office: [address] [place] [address], 1st [place] [address] T: [phone] F: [phone]

## 2024-04-17 ENCOUNTER — APPOINTMENT (OUTPATIENT)
Dept: UROLOGY | Facility: CLINIC | Age: 79
End: 2024-04-17
Payer: MEDICARE

## 2024-04-17 VITALS
DIASTOLIC BLOOD PRESSURE: 72 MMHG | WEIGHT: 152 LBS | BODY MASS INDEX: 25.95 KG/M2 | HEART RATE: 73 BPM | SYSTOLIC BLOOD PRESSURE: 182 MMHG | RESPIRATION RATE: 17 BRPM | OXYGEN SATURATION: 97 % | HEIGHT: 64 IN | TEMPERATURE: 97.7 F

## 2024-04-17 PROCEDURE — 99214 OFFICE O/P EST MOD 30 MIN: CPT | Mod: 25

## 2024-04-17 PROCEDURE — 51720 TREATMENT OF BLADDER LESION: CPT

## 2024-04-17 RX ORDER — BACILLUS CALMETTE-GUERIN 50 MG/50ML
50 POWDER, FOR SUSPENSION INTRAVESICAL
Refills: 0 | Status: COMPLETED | OUTPATIENT
Start: 2024-04-17

## 2024-04-17 RX ORDER — TAMSULOSIN HYDROCHLORIDE 0.4 MG/1
0.4 CAPSULE ORAL
Qty: 90 | Refills: 3 | Status: ACTIVE | COMMUNITY
Start: 2024-04-17 | End: 1900-01-01

## 2024-04-17 RX ADMIN — BACILLUS CALMETTE-GUERIN 0 MG: 50 POWDER, FOR SUSPENSION INTRAVESICAL at 00:00

## 2024-04-17 NOTE — HISTORY OF PRESENT ILLNESS
[FreeTextEntry1] : JOSETTE PHILLIPS Jan 24 1945  Language: Turkish Date of First visit: 10/04/2023 Accompanied by: self Contact info: Referring Provider/PCP: Dr. Hawk Fax: [phone]  CC/ Problem List: Bladder cancer =============================================================================== FIRST VISIT / Summary: Very pleasant 78 year old M here for gross hematuria, mostly brown for 2 days then stopped. Former smoker. Drinks about 5 coffees per day and one water.  LUTS, minimal sometimes slow stream, but usually not. No straining, no hesitancy or incontinence or dysuria.  They denied risk factors except as noted above including but not limited to: chemicals including dye, petroleum derivatives, chemotherapy, radiation, foreign objects (stents, catheters, stones, etc), or infections (TB, schistosomiasis, etc).  ------------------------------------------------------------------------------------------- INTERVAL VISITS: The patient's medications and allergies were reviewed and edited below. Dated The patient's medications and allergies were reviewed and edited below. Dated 04/17/2024   Here for BCG. Given shortage, only 1/2 dose available. Reports feeling well today. Denies fever, chills, dysuria or hematuria. Some sensation of slow urination but intermittent. No pain.  ===============================================================================  PMH: HLD, memory Meds: Statin ?dementia medication All: nkda FHx: brother with stones. No  malignancies SocHx: smoker, about 40 yrs 1 pack / day. no etoh.  PSH: none  ROS: Review of Systems is as per HPI unless otherwise denoted below  =============================================================================== DATA: LABS (SELECTED):---------------------------------------------------------------------------------------------------   RADS:------------------------------------------------------------------------------------------------------------------- 1/23/24: CT urogram: no filling defects in upper tracts, bladder mass at dome.  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 Additional erythematous mucosa lining one of a number of diverticuli. 2/15/24: TURBT result high grade multifocal with CIS 03/12/2024 BCG induction 1/2 strength dose 1/6 03/26/2024 BCG induction 1/2 strength dose 2/6 04/03/2024 BCG induction 1/2 strength dose 3/6 04/10/2024 BCG induction 1/2 strength dose 4/6 04/17/2024 BCG induction 1/2 strength dose 5/6  =============================================================================== PHYSICAL EXAM:  FOCUSED: ---------------------------------------------------------------------------------------------------------------- Date: 01/26/2024 Chaperone: offered and patient declined  Penis: No lesions, tenderness, curvature, plaques. Circumcised Meatus: normal caliber  ======================================================================================= DISCUSSION: ======================================================================================= ASSESSMENT and PLAN  1.bladder cancer - 2/15/24: TURBT result high grade multifocal with CIS. Discussed results - Horacio Womack [phone] - Continue BCG - cysto 1 month after induction is complete  2. LUTS - minimal after surgery - will send tamsulosin he can try for slow urination  3. /67 today - manage with PCP given low diastolic may be best to monitor  ======================================================================================= 4+procedure Thank you for allowing me to assist in the care of your patient. Should you have any questions please do not hesitate to reach out to me.   Lobo Lopez MD [facility] Physician [facility] for [facility]  [place] Office: [address] [facility], [address] [place] [address] T: [phone] F: [phone]  [place] Office: [address] [place] [address], 1st [place] [address] T: [phone] F: [phone]

## 2024-04-23 ENCOUNTER — APPOINTMENT (OUTPATIENT)
Dept: UROLOGY | Facility: CLINIC | Age: 79
End: 2024-04-23
Payer: MEDICARE

## 2024-04-23 VITALS
RESPIRATION RATE: 17 BRPM | BODY MASS INDEX: 26.29 KG/M2 | HEIGHT: 64 IN | TEMPERATURE: 98.2 F | SYSTOLIC BLOOD PRESSURE: 148 MMHG | OXYGEN SATURATION: 98 % | WEIGHT: 154 LBS | DIASTOLIC BLOOD PRESSURE: 71 MMHG | HEART RATE: 67 BPM

## 2024-04-23 DIAGNOSIS — R39.9 UNSPECIFIED SYMPTOMS AND SIGNS INVOLVING THE GENITOURINARY SYSTEM: ICD-10-CM

## 2024-04-23 PROCEDURE — 51720 TREATMENT OF BLADDER LESION: CPT

## 2024-04-23 PROCEDURE — 99213 OFFICE O/P EST LOW 20 MIN: CPT | Mod: 25

## 2024-04-23 RX ORDER — BACILLUS CALMETTE-GUERIN 50 MG/50ML
50 POWDER, FOR SUSPENSION INTRAVESICAL
Refills: 0 | Status: COMPLETED | OUTPATIENT
Start: 2024-04-23

## 2024-04-23 RX ADMIN — BACILLUS CALMETTE-GUERIN 0 MG: 50 POWDER, FOR SUSPENSION INTRAVESICAL at 00:00

## 2024-04-23 NOTE — HISTORY OF PRESENT ILLNESS
[FreeTextEntry1] : JOSETTE PHILLIPS Jan 24 1945  Language: Burundian Date of First visit: 10/04/2023 Accompanied by: self Contact info: Referring Provider/PCP: Dr. Hawk Fax: [phone]  CC/ Problem List: Bladder cancer =============================================================================== FIRST VISIT / Summary: Very pleasant 78 year old M here for gross hematuria, mostly brown for 2 days then stopped. Former smoker. Drinks about 5 coffees per day and one water.  LUTS, minimal sometimes slow stream, but usually not. No straining, no hesitancy or incontinence or dysuria.  They denied risk factors except as noted above including but not limited to: chemicals including dye, petroleum derivatives, chemotherapy, radiation, foreign objects (stents, catheters, stones, etc), or infections (TB, schistosomiasis, etc).  ------------------------------------------------------------------------------------------- INTERVAL VISITS: The patient's medications and allergies were reviewed and edited below. Dated The patient's medications and allergies were reviewed and edited below. Dated 04/23/2024  Here for BCG. Given shortage, only 1/2 dose available. Reports feeling well today. Denies fever, chills, dysuria or hematuria. Some sensation of slow urination but intermittent. No pain. Was prescribed tamsulosin at last visit and feels it is helpful with urination  ===============================================================================  PMH: HLD, memory Meds: Statin ?dementia medication All: nkda FHx: brother with stones. No  malignancies SocHx: smoker, about 40 yrs 1 pack / day. no etoh.  PSH: none  ROS: Review of Systems is as per HPI unless otherwise denoted below  =============================================================================== DATA: LABS (SELECTED):---------------------------------------------------------------------------------------------------   RADS:------------------------------------------------------------------------------------------------------------------- 1/23/24: CT urogram: no filling defects in upper tracts, bladder mass at dome.  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 Additional erythematous mucosa lining one of a number of diverticuli. 2/15/24: TURBT result high grade multifocal with CIS 03/12/2024 BCG induction 1/2 strength dose 1/6 03/26/2024 BCG induction 1/2 strength dose 2/6 04/03/2024 BCG induction 1/2 strength dose 3/6 04/10/2024 BCG induction 1/2 strength dose 4/6 04/17/2024 BCG induction 1/2 strength dose 5/6 04/23/2024 BCG induction 1/2 strength dose 6/6   =============================================================================== PHYSICAL EXAM:  FOCUSED: ---------------------------------------------------------------------------------------------------------------- Date: 01/26/2024 Chaperone: offered and patient declined  Penis: No lesions, tenderness, curvature, plaques. Circumcised Meatus: normal caliber  ======================================================================================= DISCUSSION: ======================================================================================= ASSESSMENT and PLAN  1.bladder cancer - 2/15/24: TURBT result high grade multifocal with CIS. Discussed results - Horacio RussellShanta [phone] - Completed induction BCG -f/u with cysto in 1 month   2. LUTS - minimal after surgery - continue tamsulosin  3. /67 today - manage with PCP given low diastolic may be best to monitor  ======================================================================================= 4+procedure Thank you for allowing me to assist in the care of your patient. Should you have any questions please do not hesitate to reach out to me.   Lobo Lopez MD [facility] Physician [facility] [place] for Urology  [place] Office: [address] [facility], [address] [place] [address] T: [phone] F: [phone]  [place] Office: [address] [place] [address], 1st [place] [address] T: [phone] F: [phone]

## 2024-04-24 ENCOUNTER — APPOINTMENT (OUTPATIENT)
Dept: UROLOGY | Facility: CLINIC | Age: 79
End: 2024-04-24

## 2024-05-01 ENCOUNTER — APPOINTMENT (OUTPATIENT)
Dept: UROLOGY | Facility: CLINIC | Age: 79
End: 2024-05-01

## 2024-06-04 ENCOUNTER — APPOINTMENT (OUTPATIENT)
Dept: UROLOGY | Facility: CLINIC | Age: 79
End: 2024-06-04
Payer: MEDICARE

## 2024-06-04 VITALS
WEIGHT: 154 LBS | OXYGEN SATURATION: 98 % | BODY MASS INDEX: 26.29 KG/M2 | RESPIRATION RATE: 17 BRPM | TEMPERATURE: 98.3 F | SYSTOLIC BLOOD PRESSURE: 160 MMHG | HEART RATE: 60 BPM | DIASTOLIC BLOOD PRESSURE: 88 MMHG | HEIGHT: 64 IN

## 2024-06-04 DIAGNOSIS — C67.1 MALIGNANT NEOPLASM OF DOME OF BLADDER: ICD-10-CM

## 2024-06-04 PROCEDURE — 52000 CYSTOURETHROSCOPY: CPT

## 2024-06-04 PROCEDURE — 99213 OFFICE O/P EST LOW 20 MIN: CPT | Mod: 25

## 2024-06-04 NOTE — HISTORY OF PRESENT ILLNESS
[FreeTextEntry1] : JOSETTE PHILLIPS Jan 24 1945  Language: Comoran Date of First visit: 10/04/2023 Accompanied by: self Contact info: Referring Provider/PCP: Dr. Hawk Fax: [phone]  CC/ Problem List: Bladder cancer =============================================================================== FIRST VISIT / Summary: Very pleasant 78 year old M here for gross hematuria, mostly brown for 2 days then stopped. Former smoker. Drinks about 5 coffees per day and one water.  LUTS, minimal sometimes slow stream, but usually not. No straining, no hesitancy or incontinence or dysuria.  They denied risk factors except as noted above including but not limited to: chemicals including dye, petroleum derivatives, chemotherapy, radiation, foreign objects (stents, catheters, stones, etc), or infections (TB, schistosomiasis, etc).  ------------------------------------------------------------------------------------------- INTERVAL VISITS: The patient's medications and allergies were reviewed and edited below. Dated The patient's medications and allergies were reviewed and edited below. Dated 06/04/2024   Denies fever, chills, dysuria or hematuria. Some sensation of slow urination but intermittent. No pain. Here for surveillance cysto.  ===============================================================================  PMH: HLD, memory Meds: Statin ?dementia medication All: nkda FHx: brother with stones. No  malignancies SocHx: smoker, about 40 yrs 1 pack / day. no etoh.  PSH: none  ROS: Review of Systems is as per HPI unless otherwise denoted below  =============================================================================== DATA: LABS (SELECTED):---------------------------------------------------------------------------------------------------   RADS:------------------------------------------------------------------------------------------------------------------- 1/23/24: CT urogram: no filling defects in upper tracts, bladder mass at dome.  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 Additional erythematous mucosa lining one of a number of diverticuli. 2/15/24: TURBT result high grade multifocal with CIS 03/12/2024 BCG induction 1/2 strength dose 1/6 03/26/2024 BCG induction 1/2 strength dose 2/6 04/03/2024 BCG induction 1/2 strength dose 3/6 04/10/2024 BCG induction 1/2 strength dose 4/6 04/17/2024 BCG induction 1/2 strength dose 5/6 04/23/2024 BCG induction 1/2 strength dose 6/6 06/04/2024 Cysto with bladder diverticuli (small) and prior resection sites. Posterior resection with punctate erythematous spots surrounding scar, not raised, low suspicion for tumor.    =============================================================================== PHYSICAL EXAM:  FOCUSED: ---------------------------------------------------------------------------------------------------------------- Date: 01/26/2024 Chaperone: offered and patient declined  Penis: No lesions, tenderness, curvature, plaques. Circumcised Meatus: normal caliber  ======================================================================================= DISCUSSION: ======================================================================================= ASSESSMENT and PLAN  1.bladder cancer - 2/15/24: TURBT result high grade multifocal with CIS - Son [facility] [phone] - Completed induction BCG - cysto no gross tumor - plan for maintenance if cytology/ua/cx normal  2. LUTS - minimal after surgery - continue tamsulosin  ======================================================================================= 3+procedure Thank you for allowing me to assist in the care of your patient. Should you have any questions please do not hesitate to reach out to me.   Lobo Lopez MD [facility] Physician [facility] for Urology  [place] Office: [address] [facility], [address] [place] [address] T: [phone] F: [phone]  [place] Office: [address] [place] [address], 1st [place] [address] T: [phone] F: [phone]

## 2024-06-05 LAB
APPEARANCE: CLEAR
BACTERIA: NEGATIVE /HPF
BILIRUBIN URINE: NEGATIVE
BLOOD URINE: NEGATIVE
CAST: 0 /LPF
COLOR: YELLOW
EPITHELIAL CELLS: 1 /HPF
GLUCOSE QUALITATIVE U: NEGATIVE MG/DL
KETONES URINE: NEGATIVE MG/DL
LEUKOCYTE ESTERASE URINE: ABNORMAL
MICROSCOPIC-UA: NORMAL
NITRITE URINE: NEGATIVE
PH URINE: 7
PROTEIN URINE: NORMAL MG/DL
RED BLOOD CELLS URINE: 2 /HPF
SPECIFIC GRAVITY URINE: 1.02
URINE CYTOLOGY: NORMAL
UROBILINOGEN URINE: 1 MG/DL
WHITE BLOOD CELLS URINE: 4 /HPF

## 2024-06-06 LAB — BACTERIA UR CULT: NORMAL

## 2024-06-25 ENCOUNTER — APPOINTMENT (OUTPATIENT)
Age: 79
End: 2024-06-25
Payer: MEDICARE

## 2024-06-25 VITALS
HEART RATE: 66 BPM | BODY MASS INDEX: 26.98 KG/M2 | SYSTOLIC BLOOD PRESSURE: 167 MMHG | HEIGHT: 64 IN | WEIGHT: 158 LBS | RESPIRATION RATE: 17 BRPM | TEMPERATURE: 98.2 F | OXYGEN SATURATION: 97 % | DIASTOLIC BLOOD PRESSURE: 72 MMHG

## 2024-06-25 PROCEDURE — 51720 TREATMENT OF BLADDER LESION: CPT

## 2024-06-25 PROCEDURE — 99213 OFFICE O/P EST LOW 20 MIN: CPT | Mod: 25

## 2024-06-25 RX ORDER — BACILLUS CALMETTE-GUERIN 50 MG/50ML
50 POWDER, FOR SUSPENSION INTRAVESICAL
Refills: 0 | Status: COMPLETED | OUTPATIENT
Start: 2024-06-25

## 2024-07-02 ENCOUNTER — APPOINTMENT (OUTPATIENT)
Age: 79
End: 2024-07-02
Payer: MEDICARE

## 2024-07-02 VITALS
SYSTOLIC BLOOD PRESSURE: 121 MMHG | HEIGHT: 64 IN | TEMPERATURE: 97.9 F | RESPIRATION RATE: 17 BRPM | HEART RATE: 66 BPM | DIASTOLIC BLOOD PRESSURE: 74 MMHG | BODY MASS INDEX: 26.98 KG/M2 | WEIGHT: 158 LBS | OXYGEN SATURATION: 97 %

## 2024-07-02 PROCEDURE — 99212 OFFICE O/P EST SF 10 MIN: CPT | Mod: 25

## 2024-07-02 PROCEDURE — 51720 TREATMENT OF BLADDER LESION: CPT

## 2024-07-02 RX ORDER — BACILLUS CALMETTE-GUERIN 50 MG/50ML
50 POWDER, FOR SUSPENSION INTRAVESICAL
Refills: 0 | Status: COMPLETED | OUTPATIENT
Start: 2024-07-02

## 2024-07-09 ENCOUNTER — APPOINTMENT (OUTPATIENT)
Age: 79
End: 2024-07-09
Payer: MEDICARE

## 2024-07-09 VITALS
BODY MASS INDEX: 25.95 KG/M2 | OXYGEN SATURATION: 97 % | WEIGHT: 152 LBS | SYSTOLIC BLOOD PRESSURE: 138 MMHG | RESPIRATION RATE: 14 BRPM | DIASTOLIC BLOOD PRESSURE: 68 MMHG | HEIGHT: 64 IN | HEART RATE: 73 BPM

## 2024-07-09 PROCEDURE — 81003 URINALYSIS AUTO W/O SCOPE: CPT | Mod: QW

## 2024-07-09 PROCEDURE — 99213 OFFICE O/P EST LOW 20 MIN: CPT

## 2024-07-11 LAB — BACTERIA UR CULT: NORMAL

## 2024-07-16 ENCOUNTER — APPOINTMENT (OUTPATIENT)
Age: 79
End: 2024-07-16

## 2024-07-16 ENCOUNTER — APPOINTMENT (OUTPATIENT)
Age: 79
End: 2024-07-16
Payer: MEDICARE

## 2024-07-16 VITALS
BODY MASS INDEX: 26.29 KG/M2 | SYSTOLIC BLOOD PRESSURE: 153 MMHG | TEMPERATURE: 97.9 F | HEIGHT: 64 IN | WEIGHT: 154 LBS | HEART RATE: 70 BPM | RESPIRATION RATE: 14 BRPM | OXYGEN SATURATION: 97 % | DIASTOLIC BLOOD PRESSURE: 71 MMHG

## 2024-07-16 DIAGNOSIS — C67.1 MALIGNANT NEOPLASM OF DOME OF BLADDER: ICD-10-CM

## 2024-07-16 DIAGNOSIS — R39.9 UNSPECIFIED SYMPTOMS AND SIGNS INVOLVING THE GENITOURINARY SYSTEM: ICD-10-CM

## 2024-07-16 DIAGNOSIS — R31.0 GROSS HEMATURIA: ICD-10-CM

## 2024-07-16 PROCEDURE — 99213 OFFICE O/P EST LOW 20 MIN: CPT | Mod: 25

## 2024-07-16 PROCEDURE — 51720 TREATMENT OF BLADDER LESION: CPT

## 2024-07-16 RX ORDER — BACILLUS CALMETTE-GUERIN 50 MG/50ML
50 POWDER, FOR SUSPENSION INTRAVESICAL
Refills: 0 | Status: COMPLETED | OUTPATIENT
Start: 2024-07-16

## 2024-07-30 ENCOUNTER — NON-APPOINTMENT (OUTPATIENT)
Age: 79
End: 2024-07-30

## 2024-08-02 ENCOUNTER — APPOINTMENT (OUTPATIENT)
Dept: UROLOGY | Facility: CLINIC | Age: 79
End: 2024-08-02
Payer: MEDICARE

## 2024-08-02 VITALS
TEMPERATURE: 97.5 F | HEART RATE: 71 BPM | DIASTOLIC BLOOD PRESSURE: 72 MMHG | RESPIRATION RATE: 14 BRPM | BODY MASS INDEX: 26.29 KG/M2 | SYSTOLIC BLOOD PRESSURE: 135 MMHG | OXYGEN SATURATION: 98 % | HEIGHT: 64 IN | WEIGHT: 154 LBS

## 2024-08-02 DIAGNOSIS — C67.1 MALIGNANT NEOPLASM OF DOME OF BLADDER: ICD-10-CM

## 2024-08-02 PROCEDURE — G2211 COMPLEX E/M VISIT ADD ON: CPT

## 2024-08-02 PROCEDURE — 81003 URINALYSIS AUTO W/O SCOPE: CPT | Mod: QW

## 2024-08-02 PROCEDURE — 51798 US URINE CAPACITY MEASURE: CPT

## 2024-08-02 PROCEDURE — 99213 OFFICE O/P EST LOW 20 MIN: CPT

## 2024-08-02 NOTE — HISTORY OF PRESENT ILLNESS
[FreeTextEntry1] : JOSETTE PHILLIPS Jan 24 1945  Language: Hungarian Date of First visit: 10/04/2023 Accompanied by: self Contact info: Referring Provider/PCP: Dr. Hawk Fax: [phone]  CC/ Problem List: Bladder cancer UTI =============================================================================== FIRST VISIT / Summary: Very pleasant 78 year old M here for gross hematuria, mostly brown for 2 days then stopped. Former smoker. Drinks about 5 coffees per day and one water.  LUTS, minimal sometimes slow stream, but usually not. No straining, no hesitancy or incontinence or dysuria.  They denied risk factors except as noted above including but not limited to: chemicals including dye, petroleum derivatives, chemotherapy, radiation, foreign objects (stents, catheters, stones, etc), or infections (TB, schistosomiasis, etc).  ------------------------------------------------------------------------------------------- INTERVAL VISITS: The patient's medications and allergies were reviewed and edited below. Dated  08/02/2024  Here for symptoms suggestive of UTI. Drinking water, having soup, going frequently. POCT UA does not show nitrite or LE  ===============================================================================  PMH: HLD, memory Meds: Statin ?dementia medication All: nkda FHx: brother with stones. No  malignancies SocHx: smoker, about 40 yrs 1 pack / day. no etoh.  PSH: none  ROS: Review of Systems is as per HPI unless otherwise denoted below  =============================================================================== DATA: LABS (SELECTED):---------------------------------------------------------------------------------------------------   RADS:------------------------------------------------------------------------------------------------------------------- 1/23/24: CT urogram: no filling defects in upper tracts, bladder mass at dome.  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 Additional erythematous mucosa lining one of a number of diverticuli. 2/15/24: TURBT result high grade multifocal with CIS 03/12/2024 BCG induction 1/2 strength dose 1/6 03/26/2024 BCG induction 1/2 strength dose 2/6 04/03/2024 BCG induction 1/2 strength dose 3/6 04/10/2024 BCG induction 1/2 strength dose 4/6 04/17/2024 BCG induction 1/2 strength dose 5/6 04/23/2024 BCG induction 1/2 strength dose 6/6 06/04/2024 Cysto with bladder diverticuli (small) and prior resection sites. Posterior resection with punctate erythematous spots surrounding scar, not raised, low suspicion for tumor. 06/25/2024 BCG maintenance given full dose 1/3 07/02/2024 BCG maintenance given full dose 2/3 07/16/2024 BCG maintenance given full dose 3/3  =============================================================================== PHYSICAL EXAM:  FOCUSED: ---------------------------------------------------------------------------------------------------------------- Date: 01/26/2024 Chaperone: offered and patient declined  Penis: No lesions, tenderness, curvature, plaques. Circumcised Meatus: normal caliber  ======================================================================================= DISCUSSION: ======================================================================================= ASSESSMENT and PLAN  1.bladder cancer - 2/15/24: TURBT result high grade multifocal with CIS - [facility] [phone] - Completed induction BCG and now first maintenance cycle - cysto in early September 2. LUTS - continue tamsulosin - add pyridium and cetirizine  - PVR 18 low  ======================================================================================= 3+procedure Thank you for allowing me to assist in the care of your patient. Should you have any questions please do not hesitate to reach out to me.   Lobo Lopez MD [facility] Physician [facility] for Urology  [place] Office: [address] [facility], [address] [place] [address] T: [phone] F: [phone]  [place] Office: [address] [place] [address], 1st [place] [address] T: [phone] F: [phone]

## 2024-08-02 NOTE — HISTORY OF PRESENT ILLNESS
[FreeTextEntry1] : JOSETTE PHILLIPS Jan 24 1945  Language: Tunisian Date of First visit: 10/04/2023 Accompanied by: self Contact info: Referring Provider/PCP: Dr. Hawk Fax: [phone]  CC/ Problem List: Bladder cancer UTI =============================================================================== FIRST VISIT / Summary: Very pleasant 78 year old M here for gross hematuria, mostly brown for 2 days then stopped. Former smoker. Drinks about 5 coffees per day and one water.  LUTS, minimal sometimes slow stream, but usually not. No straining, no hesitancy or incontinence or dysuria.  They denied risk factors except as noted above including but not limited to: chemicals including dye, petroleum derivatives, chemotherapy, radiation, foreign objects (stents, catheters, stones, etc), or infections (TB, schistosomiasis, etc).  ------------------------------------------------------------------------------------------- INTERVAL VISITS: The patient's medications and allergies were reviewed and edited below. Dated  08/02/2024  Here for symptoms suggestive of UTI. Drinking water, having soup, going frequently. POCT UA does not show nitrite or LE  ===============================================================================  PMH: HLD, memory Meds: Statin ?dementia medication All: nkda FHx: brother with stones. No  malignancies SocHx: smoker, about 40 yrs 1 pack / day. no etoh.  PSH: none  ROS: Review of Systems is as per HPI unless otherwise denoted below  =============================================================================== DATA: LABS (SELECTED):---------------------------------------------------------------------------------------------------   RADS:------------------------------------------------------------------------------------------------------------------- 1/23/24: CT urogram: no filling defects in upper tracts, bladder mass at dome.  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 Additional erythematous mucosa lining one of a number of diverticuli. 2/15/24: TURBT result high grade multifocal with CIS 03/12/2024 BCG induction 1/2 strength dose 1/6 03/26/2024 BCG induction 1/2 strength dose 2/6 04/03/2024 BCG induction 1/2 strength dose 3/6 04/10/2024 BCG induction 1/2 strength dose 4/6 04/17/2024 BCG induction 1/2 strength dose 5/6 04/23/2024 BCG induction 1/2 strength dose 6/6 06/04/2024 Cysto with bladder diverticuli (small) and prior resection sites. Posterior resection with punctate erythematous spots surrounding scar, not raised, low suspicion for tumor. 06/25/2024 BCG maintenance given full dose 1/3 07/02/2024 BCG maintenance given full dose 2/3 07/16/2024 BCG maintenance given full dose 3/3  =============================================================================== PHYSICAL EXAM:  FOCUSED: ---------------------------------------------------------------------------------------------------------------- Date: 01/26/2024 Chaperone: offered and patient declined  Penis: No lesions, tenderness, curvature, plaques. 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 LUTS - continue tamsulosin - add pyridium and cetirizine  - PVR 18 low  ======================================================================================= 3+procedure Thank you for allowing me to assist in the care of your patient. Should you have any questions please do not hesitate to reach out to me.   Lobo oLpez MD [facility] Physician [facility] for Urology  [place] Office: [address] [facility], [address] [place] [address] T: [phone] F: [phone]  [place] Office: [address] [place] [address], 1st [place] [address] T: [phone] F: [phone]

## 2024-08-06 ENCOUNTER — NON-APPOINTMENT (OUTPATIENT)
Age: 79
End: 2024-08-06

## 2024-08-06 LAB — BACTERIA UR CULT: NORMAL

## 2024-08-07 LAB — URINE CYTOLOGY: NORMAL

## 2024-09-03 ENCOUNTER — APPOINTMENT (OUTPATIENT)
Age: 79
End: 2024-09-03
Payer: MEDICARE

## 2024-09-03 DIAGNOSIS — D49.4 NEOPLASM OF UNSPECIFIED BEHAVIOR OF BLADDER: ICD-10-CM

## 2024-09-03 DIAGNOSIS — R31.0 GROSS HEMATURIA: ICD-10-CM

## 2024-09-03 DIAGNOSIS — C67.1 MALIGNANT NEOPLASM OF DOME OF BLADDER: ICD-10-CM

## 2024-09-03 LAB
BASOPHILS # BLD AUTO: 0.05 K/UL
BASOPHILS NFR BLD AUTO: 0.7 %
EOSINOPHIL # BLD AUTO: 0.06 K/UL
EOSINOPHIL NFR BLD AUTO: 0.9 %
ESTIMATED AVERAGE GLUCOSE: 134 MG/DL
HBA1C MFR BLD HPLC: 6.3 %
HCT VFR BLD CALC: 36.9 %
HGB BLD-MCNC: 12.1 G/DL
IMM GRANULOCYTES NFR BLD AUTO: 0.3 %
INR PPP: 1.08 RATIO
LYMPHOCYTES # BLD AUTO: 2.03 K/UL
LYMPHOCYTES NFR BLD AUTO: 30.4 %
MAN DIFF?: NORMAL
MCHC RBC-ENTMCNC: 31.7 PG
MCHC RBC-ENTMCNC: 32.8 GM/DL
MCV RBC AUTO: 96.6 FL
MONOCYTES # BLD AUTO: 0.59 K/UL
MONOCYTES NFR BLD AUTO: 8.8 %
NEUTROPHILS # BLD AUTO: 3.92 K/UL
NEUTROPHILS NFR BLD AUTO: 58.9 %
PLATELET # BLD AUTO: 315 K/UL
PT BLD: 12.3 SEC
RBC # BLD: 3.82 M/UL
RBC # FLD: 12 %
WBC # FLD AUTO: 6.67 K/UL

## 2024-09-03 PROCEDURE — 52000 CYSTOURETHROSCOPY: CPT

## 2024-09-03 PROCEDURE — 99214 OFFICE O/P EST MOD 30 MIN: CPT | Mod: 25

## 2024-09-04 LAB
ABO + RH PNL BLD: NORMAL
ALBUMIN SERPL ELPH-MCNC: 4.4 G/DL
ALP BLD-CCNC: 131 U/L
ALT SERPL-CCNC: 8 U/L
ANION GAP SERPL CALC-SCNC: 11 MMOL/L
AST SERPL-CCNC: 14 U/L
BILIRUB SERPL-MCNC: 0.6 MG/DL
BUN SERPL-MCNC: 17 MG/DL
CALCIUM SERPL-MCNC: 9.1 MG/DL
CHLORIDE SERPL-SCNC: 104 MMOL/L
CO2 SERPL-SCNC: 25 MMOL/L
CREAT SERPL-MCNC: 0.62 MG/DL
EGFR: 97 ML/MIN/1.73M2
GLUCOSE SERPL-MCNC: 115 MG/DL
POTASSIUM SERPL-SCNC: 4.1 MMOL/L
PROT SERPL-MCNC: 7.7 G/DL
SODIUM SERPL-SCNC: 140 MMOL/L

## 2024-09-04 NOTE — HISTORY OF PRESENT ILLNESS
[FreeTextEntry1] : JOSETTE PHILLIPS Jan 24 1945  Language: [place] Date of First visit: 10/04/2023 Accompanied by: self Contact info: Referring Provider/PCP: Dr. Hawk Fax: [phone]  CC/ Problem List: Bladder cancer UTI =============================================================================== FIRST VISIT / Summary: Very pleasant 78 year old M here for gross hematuria, mostly brown for 2 days then stopped. Former smoker. Drinks about 5 coffees per day and one water.  LUTS, minimal sometimes slow stream, but usually not. No straining, no hesitancy or incontinence or dysuria.  They denied risk factors except as noted above including but not limited to: chemicals including dye, petroleum derivatives, chemotherapy, radiation, foreign objects (stents, catheters, stones, etc), or infections (TB, schistosomiasis, etc).  ------------------------------------------------------------------------------------------- INTERVAL VISITS: The patient's medications and allergies were reviewed and edited below. Dated 09/03/2024   Here for cysto showing ? recurrence of CIS. no obvious papillary lesions, but given history rec biopsy. CT urogram prior to surgery.   ===============================================================================  PMH: HLD, memory Meds: Statin ?dementia medication All: nkda FHx: brother with stones. No  malignancies SocHx: smoker, about 40 yrs 1 pack / day. no etoh.  PSH: none  ROS: Review of Systems is as per HPI unless otherwise denoted below  =============================================================================== DATA: LABS (SELECTED):---------------------------------------------------------------------------------------------------   RADS:------------------------------------------------------------------------------------------------------------------- 1/23/24: CT urogram: no filling defects in upper tracts, bladder mass at dome.  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 Additional erythematous mucosa lining one of a number of diverticuli. 2/15/24: TURBT result high grade multifocal with CIS 03/12/2024 BCG induction 1/2 strength dose 1/6 03/26/2024 BCG induction 1/2 strength dose 2/6 04/03/2024 BCG induction 1/2 strength dose 3/6 04/10/2024 BCG induction 1/2 strength dose 4/6 04/17/2024 BCG induction 1/2 strength dose 5/6 04/23/2024 BCG induction 1/2 strength dose 6/6 06/04/2024 Cysto with bladder diverticuli (small) and prior resection sites. Posterior resection with punctate erythematous spots surrounding scar, not raised, low suspicion for tumor. 06/25/2024 BCG maintenance given full dose 1/3 07/02/2024 BCG maintenance given full dose 2/3 07/16/2024 BCG maintenance given full dose 3/3  =============================================================================== PHYSICAL EXAM:  FOCUSED: ---------------------------------------------------------------------------------------------------------------- Date: 01/26/2024 Chaperone: offered and patient declined  Penis: No lesions, tenderness, curvature, plaques. 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 recurrence need bx then re-evaluate BCG therapy - recent cytology negative  2. LUTS - continue tamsulosin - pyridium and cetirizine PRN - PVR 18 low  ======================================================================================= 4+procedure Thank you for allowing me to assist in the care of your patient. Should you have any questions please do not hesitate to reach out to me.   Lobo Lopez MD [facility] Physician [facility] for Urology  [place] Office: [address] [facility], [address] [place] [address] T: [phone] F: [phone]  [place] Office: [address] [place] [address], 1st [place] [address] T: [phone] F: [phone]

## 2024-09-05 LAB — BACTERIA UR CULT: NORMAL

## 2024-09-17 ENCOUNTER — APPOINTMENT (OUTPATIENT)
Age: 79
End: 2024-09-17

## 2024-09-17 DIAGNOSIS — C67.1 MALIGNANT NEOPLASM OF DOME OF BLADDER: ICD-10-CM

## 2024-10-09 NOTE — ASU PATIENT PROFILE, ADULT - PAIN CHRONIC, PROFILE
After Visit Summary   10/2/2018    Chase Zhao    MRN: [id number]           Patient Information     Date Of Birth          1943        Visit Information        Provider Department      10/2/2018 1:45 PM Marco Cruz MD [facility]        Today's Diagnoses     Chronic pain of right knee    -  1      Care Instructions    Thanks for coming today.  [facility]  [address] [place] [address]    To schedule future appointments in Ortho Clinic, you may call [phone].    To schedule ordered imaging by your provider:   Call Central [facility] [phone]    To schedule an injection ordered by your provider:  Call [facility] Injection scheduling line: [phone]  [employer] available online at:  [employer]    Please call if any further questions or concerns ([phone]).  Clinic hours 8 am to 5 pm.    Return to clinic (call) if symptoms worsen or fail to improve.            Follow-ups after your visit        Who to contact     If you have questions or need follow up information about today's clinic visit or your schedule please contact [facility] directly at [phone].  Normal or non-critical lab and imaging results will be communicated to you by MyChart, letter or phone within 4 business days after the clinic has received the results. If you do not hear from us within 7 days, please contact the clinic through MyChart or phone. If you have a critical or abnormal lab result, we will notify you by phone as soon as possible.  Submit refill requests through [employer] or call your pharmacy and they will forward the refill request to us. Please allow 3 business days for your refill to be completed.          Additional Information About Your Visit        Care EveryWhere ID     This is your Care EveryWhere ID. This could be used by other organizations to access your [place] medical records  [id number]         Your Vitals Were     Pulse Pulse Oximetry                64 94%           Blood Pressure from Last 3 Encounters:   10/02/18 113/67   09/18/18 126/73    Weight from Last 3 Encounters:   09/18/18 100.8 kg (222 lb 4.8 oz)              Today, you had the following     No orders found for display       Primary Care Provider Office Phone # Fax #    Madhu Miller [phone]       [facility] [address] [place] [address]        Equal Access to Services     [facility] : Hadii aad ku hadasho Soomaali, waaxda luqadaha, qaybta kaalmada adeegyada, waxay idiin hayaan adeeg kharash labrianne . So [facility] [phone].    ATENCIÓN: Si habla español, tiene a spencer disposición servicios gratuitos de asistencia lingüística. [facility] [phone].    We comply with applicable federal civil rights laws and [place] laws. We do not discriminate on the basis of race, color, national origin, age, disability, sex, sexual orientation, or gender identity.            Thank you!     Thank you for choosing [facility]  for your care. Our goal is always to provide you with excellent care. Hearing back from our patients is one way we can continue to improve our services. Please take a few minutes to complete the written survey that you may receive in the mail after your visit with us. Thank you!             Your Updated Medication List - Protect others around you: Learn how to safely use, store and throw away your medicines at www.disposemymeds.org.          This list is accurate as of 10/2/18  2:35 PM.  Always use your most recent med list.                   Brand Name Dispense Instructions for use Diagnosis    aspirin 81 MG chewable tablet           citalopram 40 MG tablet    celeXA          clonazePAM 1 MG tablet    klonoPIN          cycloSPORINE 0.05 % ophthalmic emulsion    RESTASIS          FIFTY50 GLUCOSE METER 2.0 w/Device Kit      Dispense meter, test strips, lancets covered by  "pt ins. E11.65 IDDM type II, uncontrolled - Test 6 times/day. Reason: Unstable diabetes        fluticasone 0.05 % cream    CUTIVATE          halobetasol 0.05 % ointment    ULTRAVATE          Insulin Syringe 30G X 5/16\" 1 ML Misc      Dispense item covered by patient insurance. For administering insulin at home.        JANTOVEN 5 MG tablet   Generic drug:  warfarin           ketoconazole 2 % cream    NIZORAL          [employer] TEST STRIPS test strip   Generic drug:  blood glucose monitoring      Dispense test strips covered by the patient insurance. Test 4 times per day, before meals and at bedtime.        lisinopril 5 MG tablet    PRINIVIL/ZESTRIL          metoprolol tartrate 25 MG tablet    LOPRESSOR          nitroGLYcerin 0.4 MG sublingual tablet    NITROSTAT          NOVOFINE 30G X 8 MM   Generic drug:  insulin pen needle           omeprazole 20 MG CR capsule    priLOSEC          rosuvastatin 40 MG tablet    CRESTOR     Take 40 mg by mouth        TYLENOL PO      Take 650 mg by mouth        VICTOZA PEN 18 MG/3ML soln   Generic drug:  liraglutide      Inject 1.8 mg Subcutaneous        vitamin D3 1000 units Caps      Take 1 capsule by mouth          " no

## 2024-10-09 NOTE — ASU PATIENT PROFILE, ADULT - VISION (WITH CORRECTIVE LENSES IF THE PATIENT USUALLY WEARS THEM):
reading glasses/Partially impaired: cannot see medication labels or newsprint, but can see obstacles in path, and the surrounding layout; can count fingers at arm's length reading glasses/Normal vision: sees adequately in most situations; can see medication labels, newsprint

## 2024-10-09 NOTE — ASU PATIENT PROFILE, ADULT - NS PREOP UNDERSTANDS INFO
No solid food/dairy/candy/gum after 0715am tomorrow;  patient reminded to come with photo ID/insurance/credit card; dress in comfortable clothes; no jewelries/contact lens/valuable; no smoking/alcohol drinking/recreational drug use today; escort to have photo ID; address and callback number was given;/yes

## 2024-10-09 NOTE — ASU PATIENT PROFILE, ADULT - NSICDXPASTMEDICALHX_GEN_ALL_CORE_FT
PAST MEDICAL HISTORY:  Dementia     Hematuria     Mild hyperlipidemia     Osteoarthritis      PAST MEDICAL HISTORY:  Bladder cancer     COPD, mild     Current smoker     Dementia     Hematuria     HTN (hypertension)     Mild hyperlipidemia     Osteoarthritis

## 2024-10-10 ENCOUNTER — RESULT REVIEW (OUTPATIENT)
Age: 79
End: 2024-10-10

## 2024-10-10 ENCOUNTER — OUTPATIENT (OUTPATIENT)
Dept: OUTPATIENT SERVICES | Facility: HOSPITAL | Age: 79
LOS: 1 days | Discharge: HOME | End: 2024-10-10
Payer: MEDICARE

## 2024-10-10 ENCOUNTER — TRANSCRIPTION ENCOUNTER (OUTPATIENT)
Age: 79
End: 2024-10-10

## 2024-10-10 VITALS
HEIGHT: 66 IN | HEART RATE: 51 BPM | WEIGHT: 149.69 LBS | SYSTOLIC BLOOD PRESSURE: 148 MMHG | RESPIRATION RATE: 16 BRPM | DIASTOLIC BLOOD PRESSURE: 56 MMHG | OXYGEN SATURATION: 99 % | TEMPERATURE: 98 F

## 2024-10-10 DIAGNOSIS — Z98.890 OTHER SPECIFIED POSTPROCEDURAL STATES: Chronic | ICD-10-CM

## 2024-10-10 PROCEDURE — 52235 CYSTOSCOPY AND TREATMENT: CPT

## 2024-10-10 PROCEDURE — 88305 TISSUE EXAM BY PATHOLOGIST: CPT | Mod: 26

## 2024-10-10 RX ORDER — FENTANYL CITRATE-0.9 % NACL/PF 300MCG/30
25 PATIENT CONTROLLED ANALGESIA VIAL INJECTION
Refills: 0 | Status: DISCONTINUED | OUTPATIENT
Start: 2024-10-10 | End: 2024-10-10

## 2024-10-10 RX ORDER — ONDANSETRON HCL/PF 4 MG/2 ML
4 VIAL (ML) INJECTION ONCE
Refills: 0 | Status: DISCONTINUED | OUTPATIENT
Start: 2024-10-10 | End: 2024-10-10

## 2024-10-10 RX ORDER — SODIUM CHLORIDE IRRIG SOLUTION 0.9 %
1000 SOLUTION, IRRIGATION IRRIGATION
Refills: 0 | Status: DISCONTINUED | OUTPATIENT
Start: 2024-10-10 | End: 2024-10-10

## 2024-10-10 RX ORDER — GEMCITABINE HYDROCHLORIDE 1 G/26.3ML
2000 INJECTION INTRAVENOUS ONCE
Refills: 0 | Status: DISCONTINUED | OUTPATIENT
Start: 2024-10-10 | End: 2024-10-10

## 2024-10-10 RX ORDER — SODIUM CHLORIDE IRRIG SOLUTION 0.9 %
1000 SOLUTION, IRRIGATION IRRIGATION ONCE
Refills: 0 | Status: COMPLETED | OUTPATIENT
Start: 2024-10-10 | End: 2024-10-10

## 2024-10-10 RX ADMIN — Medication 1 MILLILITER(S): at 22:15

## 2024-10-10 NOTE — ASU DISCHARGE PLAN (ADULT/PEDIATRIC) - CARE PROVIDER_API CALL
Lobo Lopez  Urology  [address], [address]  [place] [address]  Phone: [phone]  Fax: [phone]  Follow Up Time: 2 weeks

## 2024-10-10 NOTE — ASU DISCHARGE PLAN (ADULT/PEDIATRIC) - PAIN MANAGEMENT
Prescription given to patient/guardian/Prescription called to pharmacy/Take over the counter pain medication

## 2024-10-10 NOTE — BRIEF OPERATIVE NOTE - NSICDXBRIEFPROCEDURE_GEN_ALL_CORE_FT
PROCEDURES:  TURBT, with chemotherapeutic agent instillation into bladder 10-Oct-2024 18:04:32  Michael Nye

## 2024-10-10 NOTE — ASU DISCHARGE PLAN (ADULT/PEDIATRIC) - ASU DC SPECIAL INSTRUCTIONSFT
TRANSURETHRAL RESECTION OF BLADDER TUMOR    GENERAL: It is common to have blood in your urine after your procedure.  It may be pink or even red; and it is important to increase fluid intake to 2-3L of water per day to keep the urine as clear as possible. Please inform your doctor if you have a significant amount of clot in the urine or if you are unable to void at all.  The urine may clear and then become bloody again especially as you are more physically active. It is not uncommon to have some burning when you urinate, this will gradually improve. If a catheter is in place, it is not uncommon to have occasional leakage or urine or blood around the catheter. Please call your urologist if this is excessive and/or the urine is not draining through the catheter into the bag.    CATHETER: Some patients are sent home with a Ruano catheter, while others go home urinating on their own. A Ruano catheter continuously drains the urine from the bladder. If you still have a catheter, the nurses will review instructions and care before you go home. For men, you may have a prescription for lidocaine jelly to apply to the tip of your penis, as needed, for catheter related discomfort.     UROLOGIC MEDICATIONS:  The following medications may have been sent to your pharmacy for stent related discomfort: Flomax (tamsulosin) 0.4mg at bedtime until stent removed, Ditropan (oxybutynin) 5mg every 8 hours as needed for bladder spasms, and Pyridium (phenazopyridine) 100mg every 8 hours as needed for kidney/bladder discomfort for max 3 days (Pyridium will make your urine orange).    PAIN: You may take Tylenol (acetaminophen) 650-975mg and/or Motrin (ibuprofen) 400-600mg, both available over the counter, for pain every 6 hours as needed. Do not exceed 4000mg of Tylenol (acetaminophen) daily. You may alternate these medications such that you take one or the other every 3 hours for around the clock pain coverage.    ANTIBIOTICS: You may be given a prescription for an antibiotic, please take this medication as instructed and be sure to complete the entire course.     STOOL SOFTENERS: Do not allow yourself to become constipated as straining may cause bleeding. Take stool softeners or a laxative (ex. Miralax, Colace, Senokot, ExLax, etc), available over the counter, if needed.    ANTICOAGULATION: If you are taking any blood thinning medications, please discuss with your urologist prior to restarting these medications unless otherwise specified.    BATHING: You may shower or bathe. If going home with ruano, shower only until catheter is removed.    DIET: You may resume your regular diet and regular medication regimen.    ACTIVITY: No heavy lifting or strenuous exercise until you are evaluated at your post-operative appointment. Otherwise, you may return to your usual level of physical activity.    FOLLOW-UP: If you did not already schedule your post-operative appointment, please call your urologist to schedule and follow-up appointment.    CALL YOUR UROLOGIST IF: You have any bleeding that does not stop, inability to void >8 hours, fever over 100.4 F, chills, persistent nausea/vomiting, changes in your incision concerning for infection, or if your pain is not controlled on your discharge pain medications.

## 2024-10-11 ENCOUNTER — APPOINTMENT (OUTPATIENT)
Dept: UROLOGY | Facility: CLINIC | Age: 79
End: 2024-10-11
Payer: MEDICARE

## 2024-10-11 VITALS
HEART RATE: 57 BPM | RESPIRATION RATE: 15 BRPM | DIASTOLIC BLOOD PRESSURE: 64 MMHG | SYSTOLIC BLOOD PRESSURE: 113 MMHG | TEMPERATURE: 98 F | OXYGEN SATURATION: 95 %

## 2024-10-11 DIAGNOSIS — C67.1 MALIGNANT NEOPLASM OF DOME OF BLADDER: ICD-10-CM

## 2024-10-11 PROBLEM — F17.200 NICOTINE DEPENDENCE, UNSPECIFIED, UNCOMPLICATED: Chronic | Status: ACTIVE | Noted: 2024-10-10

## 2024-10-11 PROBLEM — C67.9 MALIGNANT NEOPLASM OF BLADDER, UNSPECIFIED: Chronic | Status: ACTIVE | Noted: 2024-10-10

## 2024-10-11 PROBLEM — I10 ESSENTIAL (PRIMARY) HYPERTENSION: Chronic | Status: ACTIVE | Noted: 2024-10-10

## 2024-10-11 PROBLEM — J44.9 CHRONIC OBSTRUCTIVE PULMONARY DISEASE, UNSPECIFIED: Chronic | Status: ACTIVE | Noted: 2024-10-10

## 2024-10-11 PROCEDURE — 51700 IRRIGATION OF BLADDER: CPT

## 2024-10-11 PROCEDURE — A4216: CPT | Mod: NC

## 2024-10-11 RX ORDER — LIDOCAINE HCL 20 MG/ML
2 AMPUL (ML) INJECTION ONCE
Refills: 0 | Status: DISCONTINUED | OUTPATIENT
Start: 2024-10-11 | End: 2024-10-11

## 2024-10-11 RX ORDER — LIDOCAINE HCL 20 MG/ML
5 AMPUL (ML) INJECTION ONCE
Refills: 0 | Status: COMPLETED | OUTPATIENT
Start: 2024-10-11 | End: 2024-10-11

## 2024-10-11 RX ADMIN — Medication 5 MILLILITER(S): at 00:35

## 2024-10-14 LAB — SURGICAL PATHOLOGY STUDY: SIGNIFICANT CHANGE UP

## 2024-10-22 ENCOUNTER — APPOINTMENT (OUTPATIENT)
Age: 79
End: 2024-10-22
Payer: MEDICARE

## 2024-10-22 VITALS
SYSTOLIC BLOOD PRESSURE: 129 MMHG | TEMPERATURE: 97 F | HEIGHT: 66 IN | BODY MASS INDEX: 25.23 KG/M2 | RESPIRATION RATE: 14 BRPM | OXYGEN SATURATION: 99 % | WEIGHT: 157 LBS | DIASTOLIC BLOOD PRESSURE: 76 MMHG | HEART RATE: 65 BPM

## 2024-10-22 DIAGNOSIS — C67.1 MALIGNANT NEOPLASM OF DOME OF BLADDER: ICD-10-CM

## 2024-10-22 DIAGNOSIS — D49.4 NEOPLASM OF UNSPECIFIED BEHAVIOR OF BLADDER: ICD-10-CM

## 2024-10-22 PROCEDURE — 99214 OFFICE O/P EST MOD 30 MIN: CPT

## 2024-11-05 ENCOUNTER — APPOINTMENT (OUTPATIENT)
Age: 79
End: 2024-11-05

## 2024-11-13 ENCOUNTER — NON-APPOINTMENT (OUTPATIENT)
Age: 79
End: 2024-11-13

## 2024-11-13 ENCOUNTER — APPOINTMENT (OUTPATIENT)
Age: 79
End: 2024-11-13
Payer: MEDICARE

## 2024-11-13 VITALS
OXYGEN SATURATION: 99 % | DIASTOLIC BLOOD PRESSURE: 58 MMHG | TEMPERATURE: 96.8 F | HEIGHT: 66 IN | BODY MASS INDEX: 24.59 KG/M2 | WEIGHT: 153 LBS | HEART RATE: 58 BPM | RESPIRATION RATE: 15 BRPM | SYSTOLIC BLOOD PRESSURE: 139 MMHG

## 2024-11-13 DIAGNOSIS — C67.1 MALIGNANT NEOPLASM OF DOME OF BLADDER: ICD-10-CM

## 2024-11-13 DIAGNOSIS — R39.9 UNSPECIFIED SYMPTOMS AND SIGNS INVOLVING THE GENITOURINARY SYSTEM: ICD-10-CM

## 2024-11-13 PROCEDURE — 99212 OFFICE O/P EST SF 10 MIN: CPT | Mod: 25

## 2024-11-13 PROCEDURE — 51720 TREATMENT OF BLADDER LESION: CPT

## 2024-11-15 RX ORDER — BACILLUS CALMETTE-GUERIN 50 MG/50ML
50 POWDER, FOR SUSPENSION INTRAVESICAL
Refills: 0 | Status: COMPLETED | OUTPATIENT
Start: 2024-11-15

## 2024-11-15 RX ADMIN — BACILLUS CALMETTE-GUERIN 0 MG: 50 POWDER, FOR SUSPENSION INTRAVESICAL at 00:00

## 2024-11-26 ENCOUNTER — APPOINTMENT (OUTPATIENT)
Age: 79
End: 2024-11-26
Payer: MEDICARE

## 2024-11-26 VITALS
DIASTOLIC BLOOD PRESSURE: 53 MMHG | TEMPERATURE: 97.7 F | RESPIRATION RATE: 15 BRPM | HEIGHT: 66 IN | SYSTOLIC BLOOD PRESSURE: 113 MMHG | OXYGEN SATURATION: 97 % | HEART RATE: 65 BPM | BODY MASS INDEX: 24.59 KG/M2 | WEIGHT: 153 LBS

## 2024-11-26 PROCEDURE — 51720 TREATMENT OF BLADDER LESION: CPT

## 2024-11-26 RX ORDER — BACILLUS CALMETTE-GUERIN 50 MG/50ML
50 POWDER, FOR SUSPENSION INTRAVESICAL
Refills: 0 | Status: COMPLETED | OUTPATIENT
Start: 2024-11-26

## 2024-11-26 RX ADMIN — BACILLUS CALMETTE-GUERIN 0 MG: 50 POWDER, FOR SUSPENSION INTRAVESICAL at 00:00

## 2024-12-03 ENCOUNTER — APPOINTMENT (OUTPATIENT)
Age: 79
End: 2024-12-03
Payer: MEDICARE

## 2024-12-03 ENCOUNTER — RESULT CHARGE (OUTPATIENT)
Age: 79
End: 2024-12-03

## 2024-12-03 VITALS
OXYGEN SATURATION: 94 % | DIASTOLIC BLOOD PRESSURE: 69 MMHG | WEIGHT: 153 LBS | HEIGHT: 66 IN | TEMPERATURE: 96.9 F | SYSTOLIC BLOOD PRESSURE: 107 MMHG | RESPIRATION RATE: 15 BRPM | BODY MASS INDEX: 24.59 KG/M2 | HEART RATE: 71 BPM

## 2024-12-03 DIAGNOSIS — R30.0 DYSURIA: ICD-10-CM

## 2024-12-03 DIAGNOSIS — C67.1 MALIGNANT NEOPLASM OF DOME OF BLADDER: ICD-10-CM

## 2024-12-03 PROCEDURE — 51720 TREATMENT OF BLADDER LESION: CPT

## 2024-12-03 PROCEDURE — 99212 OFFICE O/P EST SF 10 MIN: CPT | Mod: 25

## 2024-12-03 RX ORDER — BACILLUS CALMETTE-GUERIN 50 MG/50ML
50 POWDER, FOR SUSPENSION INTRAVESICAL
Refills: 0 | Status: COMPLETED | OUTPATIENT
Start: 2024-12-03

## 2024-12-03 RX ADMIN — BACILLUS CALMETTE-GUERIN 0 MG: 50 POWDER, FOR SUSPENSION INTRAVESICAL at 00:00

## 2024-12-05 LAB — BACTERIA UR CULT: NORMAL

## 2025-01-21 ENCOUNTER — APPOINTMENT (OUTPATIENT)
Age: 80
End: 2025-01-21
Payer: MEDICARE

## 2025-01-21 VITALS
DIASTOLIC BLOOD PRESSURE: 86 MMHG | TEMPERATURE: 96.3 F | WEIGHT: 150 LBS | RESPIRATION RATE: 15 BRPM | BODY MASS INDEX: 24.11 KG/M2 | HEART RATE: 77 BPM | SYSTOLIC BLOOD PRESSURE: 153 MMHG | OXYGEN SATURATION: 94 % | HEIGHT: 66 IN

## 2025-01-21 DIAGNOSIS — C67.1 MALIGNANT NEOPLASM OF DOME OF BLADDER: ICD-10-CM

## 2025-01-21 PROCEDURE — 99213 OFFICE O/P EST LOW 20 MIN: CPT | Mod: 25

## 2025-01-21 PROCEDURE — 52000 CYSTOURETHROSCOPY: CPT

## 2025-01-22 LAB
APPEARANCE: CLEAR
BACTERIA: NEGATIVE /HPF
BILIRUBIN URINE: NEGATIVE
BLOOD URINE: ABNORMAL
CAST: 0 /LPF
COLOR: YELLOW
EPITHELIAL CELLS: 5 /HPF
GLUCOSE QUALITATIVE U: >=1000 MG/DL
KETONES URINE: NEGATIVE MG/DL
LEUKOCYTE ESTERASE URINE: ABNORMAL
MICROSCOPIC-UA: NORMAL
NITRITE URINE: NEGATIVE
PH URINE: 6
PROTEIN URINE: NORMAL MG/DL
RED BLOOD CELLS URINE: 352 /HPF
SPECIFIC GRAVITY URINE: 1.02
UROBILINOGEN URINE: 1 MG/DL
WHITE BLOOD CELLS URINE: 18 /HPF

## 2025-01-23 LAB — BACTERIA UR CULT: NORMAL

## 2025-01-27 LAB — URINE CYTOLOGY: NORMAL

## 2025-02-25 ENCOUNTER — APPOINTMENT (OUTPATIENT)
Age: 80
End: 2025-02-25

## 2025-02-26 ENCOUNTER — APPOINTMENT (OUTPATIENT)
Age: 80
End: 2025-02-26
Payer: MEDICARE

## 2025-02-26 VITALS
WEIGHT: 150 LBS | OXYGEN SATURATION: 98 % | BODY MASS INDEX: 24.11 KG/M2 | HEART RATE: 72 BPM | SYSTOLIC BLOOD PRESSURE: 124 MMHG | DIASTOLIC BLOOD PRESSURE: 68 MMHG | RESPIRATION RATE: 15 BRPM | TEMPERATURE: 96.5 F | HEIGHT: 66 IN

## 2025-02-26 PROCEDURE — 51720 TREATMENT OF BLADDER LESION: CPT

## 2025-02-26 RX ORDER — BACILLUS CALMETTE-GUERIN 50 MG/50ML
50 POWDER, FOR SUSPENSION INTRAVESICAL
Refills: 0 | Status: COMPLETED | OUTPATIENT
Start: 2025-02-26

## 2025-02-26 RX ADMIN — BACILLUS CALMETTE-GUERIN 0 MG: 50 POWDER, FOR SUSPENSION INTRAVESICAL at 00:00

## 2025-02-27 LAB — BACTERIA UR CULT: NORMAL

## 2025-03-05 ENCOUNTER — APPOINTMENT (OUTPATIENT)
Age: 80
End: 2025-03-05
Payer: MEDICARE

## 2025-03-05 VITALS
DIASTOLIC BLOOD PRESSURE: 54 MMHG | OXYGEN SATURATION: 97 % | RESPIRATION RATE: 15 BRPM | HEIGHT: 66 IN | BODY MASS INDEX: 23.46 KG/M2 | HEART RATE: 72 BPM | SYSTOLIC BLOOD PRESSURE: 120 MMHG | WEIGHT: 146 LBS | TEMPERATURE: 97.7 F

## 2025-03-05 DIAGNOSIS — C67.1 MALIGNANT NEOPLASM OF DOME OF BLADDER: ICD-10-CM

## 2025-03-05 PROCEDURE — 99213 OFFICE O/P EST LOW 20 MIN: CPT | Mod: 25

## 2025-03-05 PROCEDURE — 51720 TREATMENT OF BLADDER LESION: CPT

## 2025-03-05 RX ORDER — BACILLUS CALMETTE-GUERIN 50 MG/50ML
50 POWDER, FOR SUSPENSION INTRAVESICAL
Refills: 0 | Status: COMPLETED | OUTPATIENT
Start: 2025-03-05

## 2025-03-05 RX ADMIN — BACILLUS CALMETTE-GUERIN 0 MG: 50 POWDER, FOR SUSPENSION INTRAVESICAL at 00:00

## 2025-03-11 ENCOUNTER — APPOINTMENT (OUTPATIENT)
Age: 80
End: 2025-03-11
Payer: MEDICARE

## 2025-03-11 VITALS
DIASTOLIC BLOOD PRESSURE: 74 MMHG | RESPIRATION RATE: 15 BRPM | BODY MASS INDEX: 23.46 KG/M2 | TEMPERATURE: 98.2 F | OXYGEN SATURATION: 96 % | HEIGHT: 66 IN | SYSTOLIC BLOOD PRESSURE: 122 MMHG | HEART RATE: 74 BPM | WEIGHT: 146 LBS

## 2025-03-11 DIAGNOSIS — R39.9 UNSPECIFIED SYMPTOMS AND SIGNS INVOLVING THE GENITOURINARY SYSTEM: ICD-10-CM

## 2025-03-11 PROCEDURE — 99213 OFFICE O/P EST LOW 20 MIN: CPT | Mod: 25

## 2025-03-11 PROCEDURE — 51720 TREATMENT OF BLADDER LESION: CPT

## 2025-03-11 PROCEDURE — 81003 URINALYSIS AUTO W/O SCOPE: CPT | Mod: QW

## 2025-03-12 LAB
BILIRUB UR QL STRIP: NEGATIVE
GLUCOSE UR-MCNC: NEGATIVE
HCG UR QL: 1 EU/DL
HGB UR QL STRIP.AUTO: NORMAL
KETONES UR-MCNC: NEGATIVE
LEUKOCYTE ESTERASE UR QL STRIP: NORMAL
NITRITE UR QL STRIP: NEGATIVE
PH UR STRIP: 6
PROT UR STRIP-MCNC: NORMAL
SP GR UR STRIP: 1.02

## 2025-03-12 RX ORDER — BACILLUS CALMETTE-GUERIN 50 MG/50ML
50 POWDER, FOR SUSPENSION INTRAVESICAL
Refills: 0 | Status: COMPLETED | OUTPATIENT
Start: 2025-03-12

## 2025-03-12 RX ADMIN — BACILLUS CALMETTE-GUERIN 0 MG: 50 POWDER, FOR SUSPENSION INTRAVESICAL at 00:00

## 2025-03-14 LAB — BACTERIA UR CULT: NORMAL

## 2025-03-18 ENCOUNTER — APPOINTMENT (OUTPATIENT)
Age: 80
End: 2025-03-18
Payer: MEDICARE

## 2025-03-18 VITALS
RESPIRATION RATE: 15 BRPM | BODY MASS INDEX: 23.46 KG/M2 | OXYGEN SATURATION: 99 % | SYSTOLIC BLOOD PRESSURE: 121 MMHG | HEIGHT: 66 IN | WEIGHT: 146 LBS | DIASTOLIC BLOOD PRESSURE: 72 MMHG | HEART RATE: 67 BPM | TEMPERATURE: 97.9 F

## 2025-03-18 DIAGNOSIS — R30.0 DYSURIA: ICD-10-CM

## 2025-03-18 DIAGNOSIS — C67.1 MALIGNANT NEOPLASM OF DOME OF BLADDER: ICD-10-CM

## 2025-03-18 PROCEDURE — 99214 OFFICE O/P EST MOD 30 MIN: CPT

## 2025-03-18 PROCEDURE — G2211 COMPLEX E/M VISIT ADD ON: CPT

## 2025-03-19 LAB
APPEARANCE: ABNORMAL
BACTERIA: NEGATIVE /HPF
BILIRUBIN URINE: NEGATIVE
BLOOD URINE: ABNORMAL
CALCIUM OXALATE CRYSTALS: PRESENT
CAST: 2 /LPF
COLOR: YELLOW
EPITHELIAL CELLS: 2 /HPF
GLUCOSE QUALITATIVE U: NEGATIVE MG/DL
KETONES URINE: NEGATIVE MG/DL
LEUKOCYTE ESTERASE URINE: ABNORMAL
MICROSCOPIC-UA: NORMAL
NITRITE URINE: NEGATIVE
PH URINE: 6
PROTEIN URINE: 300 MG/DL
RED BLOOD CELLS URINE: 8 /HPF
REVIEW: NORMAL
SPECIFIC GRAVITY URINE: 1.03
UROBILINOGEN URINE: 1 MG/DL
WBC CLUMPS: PRESENT
WHITE BLOOD CELLS URINE: >998 /HPF

## 2025-03-20 LAB
BACTERIA UR CULT: NORMAL
URINE CYTOLOGY: NORMAL

## 2025-05-13 ENCOUNTER — NON-APPOINTMENT (OUTPATIENT)
Age: 80
End: 2025-05-13

## 2025-05-13 ENCOUNTER — APPOINTMENT (OUTPATIENT)
Age: 80
End: 2025-05-13
Payer: MEDICARE

## 2025-05-13 VITALS
HEIGHT: 66 IN | OXYGEN SATURATION: 97 % | HEART RATE: 75 BPM | SYSTOLIC BLOOD PRESSURE: 137 MMHG | TEMPERATURE: 96.3 F | RESPIRATION RATE: 15 BRPM | WEIGHT: 146 LBS | BODY MASS INDEX: 23.46 KG/M2 | DIASTOLIC BLOOD PRESSURE: 76 MMHG

## 2025-05-13 DIAGNOSIS — C67.1 MALIGNANT NEOPLASM OF DOME OF BLADDER: ICD-10-CM

## 2025-05-13 PROCEDURE — 99214 OFFICE O/P EST MOD 30 MIN: CPT | Mod: 25

## 2025-05-13 PROCEDURE — 52000 CYSTOURETHROSCOPY: CPT

## 2025-05-14 LAB
APPEARANCE: CLEAR
BACTERIA: NEGATIVE /HPF
BILIRUBIN URINE: NEGATIVE
BLOOD URINE: NEGATIVE
CALCIUM OXALATE CRYSTALS: PRESENT
CAST: 1 /LPF
COLOR: NORMAL
EPITHELIAL CELLS: 2 /HPF
GLUCOSE QUALITATIVE U: 500 MG/DL
KETONES URINE: ABNORMAL MG/DL
LEUKOCYTE ESTERASE URINE: ABNORMAL
MICROSCOPIC-UA: NORMAL
NITRITE URINE: NEGATIVE
PH URINE: 5.5
PROTEIN URINE: NORMAL MG/DL
RED BLOOD CELLS URINE: 1 /HPF
REVIEW: NORMAL
SPECIFIC GRAVITY URINE: 1.02
URINE CYTOLOGY: NORMAL
UROBILINOGEN URINE: 1 MG/DL
WHITE BLOOD CELLS URINE: 5 /HPF

## 2025-05-16 LAB — BACTERIA UR CULT: NORMAL

## (undated) DEVICE — FOLEY CATH 3-WAY 20FR 30CC LATEX HEMATURIA

## (undated) DEVICE — TAPE SILK 3"

## (undated) DEVICE — DRAPE TOWEL BLUE 17" X 24"

## (undated) DEVICE — ELCTR PLASMA LOOP MEDIUM ANGLED 24FR 12-30 DEG

## (undated) DEVICE — SOL IRR BAG NS 0.9% 3000ML

## (undated) DEVICE — UROVAC

## (undated) DEVICE — SLV COMPRESSION KNEE MED

## (undated) DEVICE — GLV 7.5 PROTEXIS (WHITE)

## (undated) DEVICE — FOLEY CATH 3-WAY 22FR 30CC LATEX HEMATURIA

## (undated) DEVICE — MARKING PEN W RULER

## (undated) DEVICE — DRSG TELFA 3 X 8

## (undated) DEVICE — PREP BETADINE KIT

## (undated) DEVICE — TUBING RANGER FLUID IRRIGATION SET DISP

## (undated) DEVICE — DRAINAGE BAG URINARY 4L

## (undated) DEVICE — NDL HYPO SAFE 18G X 1.5" (PINK)

## (undated) DEVICE — WARMING BLANKET UPPER ADULT

## (undated) DEVICE — PACK CYSTO

## (undated) DEVICE — TUBING TUR 2 PRONG

## (undated) DEVICE — VENODYNE/SCD SLEEVE CALF MEDIUM

## (undated) DEVICE — ELCTR PLASMA BUTTON OVAL 24FR 12-30 DEG

## (undated) DEVICE — POSITIONER FOAM EGG CRATE ULNAR 2PCS (PINK)

## (undated) DEVICE — TUBING SUCTION NONCONDUCTIVE 6MM X 12FT